# Patient Record
Sex: MALE | Race: WHITE | NOT HISPANIC OR LATINO | Employment: FULL TIME | URBAN - METROPOLITAN AREA
[De-identification: names, ages, dates, MRNs, and addresses within clinical notes are randomized per-mention and may not be internally consistent; named-entity substitution may affect disease eponyms.]

---

## 2019-05-15 ENCOUNTER — TELEPHONE (OUTPATIENT)
Dept: PALLIATIVE MEDICINE | Facility: CLINIC | Age: 59
End: 2019-05-15

## 2019-05-15 NOTE — TELEPHONE ENCOUNTER
[facility] Referral      1. Confirmed address with patient? Yes  2. Confirmed phone number with patient? Yes  3. Confirmed referring provider? Yes  4. Is the PCP the same as the referring provider? Yes  5. Has the patient been to any previous pain clinics? Yes  (If yes, send HUNTER with welcome letter) Meredith  6. Which insurance are we to bill for this appointment?  [facility]    7. Informed pt of cancellation (48 hour) policy? Yes    REGARDING OPIOID MEDICATIONS: We will always address appropriateness of opioid pain medications, but we generally will not automatically take on a prescribing role. When we do take on prescribing of opioids for chronic pain, it is in collaboration with the referring physician for an intermediate period of time (months), with an expectation that the primary physician or provider will assume the prescribing role if medications are effective at stable doses with demonstrated compliance. Therefore, please do not assume that your prescribing responsibilities end on the day of pain clinic consultation.  8. Informed pt of prescribing policy? Yes    9.Please be aware that once you are established with a pain provider and location, you will need to continue have all future visits with that provider and location. It is best to determine what location is the most convenient for you and schedule with that one.    ** PATIENT INFORMED OF THIS POLICY Yes      9. Referring Provider: Lei Díaz     10. Criteria for Triage Eval:   -Missed/Failed 1st DUAL appointment? N/A   -Medication Focused? N/A   -Mental Health Concerns? (e.g. Recent psych hospitalization/snap shot)? N/A   -Active substance abuse? N/A   -Patient behaviors (e.g. Offensive language/raised voice)? N/A

## 2019-05-23 NOTE — PROGRESS NOTES
[facility] Consultation    Date of visit: 5/23/2019    Reason for consultation:    Yasir Hines is a 59 year old male who is seen in consultation today at the request of his primary care physician, Lei Díaz.     Consultation and Evaluation for: Chronic low back and buttock pain    Review of [place] Prescription Monitoring Program (): Today I have also reviewed the patient's history of controlled substance use, as provided by [place] licensed pharmacies and prescriber dispensers. YES, no controlled substances.     Review of Pain Questionnaire: Please see the [facility] health questionnaire, which the patient completed and reviewed with me in detail.    Review of Electronic Chart: Today I have also reviewed available medical information in the patient's medical record at [facility] including relevant provider notes, laboratory work, and imaging.     Yasir Hines has not been seen at a pain clinic in the past.      Chief Complaint:    Chief Complaint   Patient presents with     Pain       Pain history:  Yasir Hines is a 59 year old male who presents for initial evaluation of chief pain complaint of Right sided low back pain radiating into the right thigh.     - Right sided buttock pain that began years ago.  This is worse in the morning and better as the day progresses.  He has had Right SI joint injections through [facility] in the past that were only helpful for about a month.  He has not had one of these for over a year now.   - New (3-6 months) onset of right thigh numbness and shooting pain.  This is most concerning to him.  He denies any weakness and continues to run about 25 miles/week.  He has run many marathons and would like to run  again this year.  He missed it last year secondary to pain.   - He has participated in formal PT about a year ago and continues to do the HEP designed for him.   - He is not on any  medications for pain.  He has used Gabepentin in the past and did not find it effective for his pain.   - Last MRI was done at [facility] on 10/20/2016. He brought in the report with him today.  Pain has changed since that time.    - He works in [place] and has a daughter who is currently a pain fellow working here at [place].    Red Flags: The patient denies bowel or bladder incontinence, parasthesias, weakness, saddle anesthesia, unintentional weight loss, or fever/chills/sweats.         Pain Treatments:  1. Medications:       Current pain medications: NONE         Previous pain medications: Gabapentin    2. Physical Therapy: has done formal PT in the past and continues to do the exercises they gave him which are helpful for his SI joint pain     TENS unit: no  3. Pain psychology: no  4. Surgery: never  5. Injections: Right SI joint injections  6. Alternative Therapies:    Chiropractic: no    Acupuncture: no      Diagnostic tests:  MRI of LUMBAR SPINE was completed on 10/20/2016 AT [facility] was reviewed with the patient and shows:  (see scanned report)     1. Developmental and degenerative disc changes.  Disc space narrowing most prominent at L3-4 and L5-S1.  Pronounced right anterolateral disc herniation at L4-5.  Facet arthropathy most prominent at L4-5 and there is degenerative right facet edema at this level.  Some extension to surrounding soft tissues.   2. At L4-5 slight impingement L5 nerve roots in the lateral recesses  3. At L5-S1, borderline moderate left foraminal stenosis        Past Medical History:  No past medical history on file.    Past Surgical History:  No past surgical history on file.    Medications:  Current Outpatient Medications   Medication Sig Dispense Refill     atorvastatin (LIPITOR) 40 MG tablet Take 1 tablet by mouth daily       gabapentin (NEURONTIN) 300 MG capsule TAKE ONE CAPSULE BY MOUTH IN THE MORNING AND 2 CAPS AT BEDTIME 270 capsule 0        Allergies:   No Known Allergies    Family history:  No family history on file.    Review of Systems:    POSTIVE IN BOLD  GENERAL: fever/chills, fatigue, general unwell feeling, weight gain/loss.  HEAD/EYES:  headache, dizziness, or vision changes.    EARS/NOSE/THROAT:  Nosebleeds, hearing loss, sinus infection, earache, tinnitus.  IMMUNE:  Allergies, cancer, immune deficiency, or infections.  SKIN:  Urticaria, rash, hives  HEME/Lymphatic:   anemia, easy bruising, easy bleeding.  RESPIRATORY:  cough, wheezing, or shortness of breath  CARDIOVASCULAR/Circulation:  Extremity edema, syncope, hypertension, tachycardia, or angina.  GASTROINTESTINAL:  abdominal pain, nausea/emesis, diarrhea, constipation,  hematochezia, or melena.  ENDOCRINE:  Diabetes, steroid use,  thyroid disease or osteoporosis.  MUSCULOSKELETAL: neck pain, back pain, arthralgia, arthritis, or gout.  GENITOURINARY:  frequency, urgency, dysuria, difficulty voiding, hematuria or incontinence.  NEUROLOGIC:  weakness, numbness, paresthesias, seizure, tremor, stroke or memory loss.  PSYCHIATRIC:  depression, anxiety, stress, suicidal thoughts or mood swings.     Physical Exam:  Vitals:    05/24/19 0951   BP: 152/87   Pulse: 64   SpO2: 97%   Weight: 96.2 kg (212 lb)     Exam:  Constitutional: Well developed, well nourished, appears stated age.  HEENT: Head atraumatic, normocephalic. Neck supple. No obvious neck masses.  Skin: No rash, lesions, or petechiae of exposed skin.   Extremities: No clubbing, cyanosis, or edema.  Psychiatric/mental status: Alert, without lethargy or stupor. Speech fluent. Appropriate affect. Mood normal. Able to follow commands without difficulty.     Musculoskeletal exam:  Gait/Station/Posture:   Normal stance, arm swing, and stride; no antalgia or Trendelenburg  Normal bulk and tone. Unremarkable spinal curvature. ASIS heights even.     Cervical spine:  Range of motion within normal limits      Lumbar spine:  Range of motion  within normal limits    Rotation/ext to right: pain free   Rotation/ext to left: pain free  Myofascial tenderness:  none  Focal tenderness: POS right SI joint tenderness.  NO gluteal, piriformis, GT, or IT tenderness  SLR: positive on the right.  Reproduced pain going into the L4 dermatome      Neurologic exam:  CN:  Cranial nerves 2-12 are grossly intact  Motor Strength:  5/5 symmetric LE strength  Hip flexion (Iliosoas L1,2,3)  Hip extension (gluteus maxiumus L5, S1,2)   Hip abduction (gluteus medius L4,5, S1)  Knee flexion (hamstrings L5, S1-2)  Knee extension (quadricepts L2,3,4)  Ankle dorsiflexion (tibialis anterior L4,5)  Ankle plantarflexion (gastrocnemius, soleus S1-2)  Ankle eversion (peronei L5, S1)  Big toe extension (ext. Mckee lungus L5,S1)    Sensory:  (upper and lower extremities):   Light touch: normal    Allodynia: absent    Hyperalgesia: absent        Assessment:      Chronic right sided low back and buttock pain that does not radiate.  Etiology likely SI joint vs piriformis syndrome.  He has had benefit with previous SI joint injection.  He also has facet arthropathy at L4-5 with facet edema on the right and this could be contributing.     New onset (3-6 months) of right sided anterior thigh pain and numbness in the L3 and L4 distribution.  Etiology likely secondary to a disc protrusion at L4-5 with impingement of the L4 nerve root.  However, meralgia parasthetica is in my differential diagnosis.         ICD-10-CM    1. Lumbar radiculopathy M54.16 PAIN INJECTION EVAL/TREAT/FOLLOW UP   2. Sacroiliitis (H) M46.1 PAIN INJECTION EVAL/TREAT/FOLLOW UP       Plan:  The following recommendations were given to the patient. Diagnosis, treatment options, risks, benefits, and alternatives were discussed, and all questions were answered. The patient expressed understanding of the plan for management.       1. Physical Therapy: Continue HEP  2. Diagnostic Studies: Reviewed lumbar MRI from Oct of 2016.  Will  consider repeat MRI if he does not get pain relief from injections. His pain has changed since his most recent MRI.   3. Medication Management:   1. NONE    MEDICATIONS:   No orders of the defined types were placed in this encounter.    4. Further procedures recommended:   1. Right SI joint injection   2. Right L4-5 TRANSFORAMINAL EPIDURAL STEROID INJECTION   3. Could consider right lateral femoral cutaneous injection in the future if he does not get pain relief   5. Referrals: Could consider neurosurgery consult for consideration of microdiscectomy in the future.   6. Release of information: NONE  7. Follow up: for injections      I spent 30 minutes of time face to face with the patient.  Greater than 50% of this time was spent in patient counseling and/or coordination of care regarding principles of multidisciplinary care, medication management, and treatment options as discussed above.         Jill PerezMD Pain Management

## 2019-05-24 ENCOUNTER — OFFICE VISIT (OUTPATIENT)
Dept: PALLIATIVE MEDICINE | Facility: CLINIC | Age: 59
End: 2019-05-24
Payer: COMMERCIAL

## 2019-05-24 VITALS
WEIGHT: 212 LBS | DIASTOLIC BLOOD PRESSURE: 87 MMHG | HEART RATE: 64 BPM | SYSTOLIC BLOOD PRESSURE: 152 MMHG | OXYGEN SATURATION: 97 %

## 2019-05-24 DIAGNOSIS — M46.1 SACROILIITIS (H): ICD-10-CM

## 2019-05-24 DIAGNOSIS — M54.16 LUMBAR RADICULOPATHY: Primary | ICD-10-CM

## 2019-05-24 PROCEDURE — 99203 OFFICE O/P NEW LOW 30 MIN: CPT | Performed by: ANESTHESIOLOGY

## 2019-05-24 RX ORDER — LOSARTAN POTASSIUM 50 MG/1
50 TABLET ORAL DAILY
COMMUNITY
Start: 2019-05-09 | End: 2021-08-30

## 2019-05-24 NOTE — PATIENT INSTRUCTIONS
Yasir to follow up with Primary Care provider regarding elevated blood pressure.      They will call you to schedule the injections.  We will be doing a Right L4 epidural injection and a Right SI joint injection.      We will see how you do after this and make a plan accordingly.  We discussed a possible neurosurgery referral to discuss microdiscectomy and a repeat MRI.     We also talked about meralgia parasthetica being in the differential diagnosis.     Jill Perez MD

## 2019-05-28 ENCOUNTER — TELEPHONE (OUTPATIENT)
Dept: PALLIATIVE MEDICINE | Facility: CLINIC | Age: 59
End: 2019-05-28

## 2019-05-28 NOTE — PROGRESS NOTES
[facility] - Procedure Note    Date of Service: 5/29/2019    Procedure performed: Right L4-5 transforaminal epidural steroid injection & Right SI joint injection with fluoroscopic guidance  Diagnosis: Lumbar spondylosis; Lumbar radiculitis/radiculopathy & Sacroilitis  : Jill Perez MD   Anesthesia: none    Indications: Yaisr Hines is a 59 year old male who is seen at the request of myself for lumbar transforaminal epidural & Right SI joint steroid injection. The patient describes right sided anterior thigh numbness and pain and right sided buttock pain made worse with running. The patient has been exhibiting symptoms consistent with lumbar intraspinal inflammation and radiculopathy. Symptoms have been persistent, disabling, and intermittently severe. The patient reports minimal improvement with conservative treatment, including PT and medications.    Lumbar MRI was done at [facility] on 10/20/2016 which showed     1. Developmental and degenerative disc changes.  Disc space narrowing most prominent at L3-4 and L5-S1.  Pronounced right anterolateral disc herniation at L4-5.  Facet arthropathy most prominent at L4-5 and there is degenerative right facet edema at this level.  Some extension to surrounding soft tissues.   2. At L4-5 slight impingement L5 nerve roots in the lateral recesses  3. At L5-S1, borderline moderate left foraminal stenosis      Allergies:    No Known Allergies     Vitals:  BP (!) 153/94   Pulse 58   SpO2 98%     Review of Systems: The patient denies recent fever, chills, illness, use of antibiotics or anticoagulants. All other 10-point review of systems negative.     Procedure: The procedure and risks were explained, and informed written consent was obtained from the patient. Risks include but are not limited to: infection, bleeding, increased pain, and damage to soft tissue, nerve, muscle, and vasculature structures. After getting informed consent, patient was  brought into the procedure suite and was placed in a prone position on the procedure table. A Pause for the Cause was performed. Patient was prepped and draped in sterile fashion.     After identifying the right L4-5 neuroforamen, the C-arm was rotated to a right lateral oblique angle.  A total of 4.5 mL of Lidocaine 1% was used to anesthetize the skin and the needle track at a skin entry site coaxial with the fluoroscopy beam, and overriding the superior aspect of the neuroforamen.  A 22 gauge 5 inch spinal needle was advanced under intermittent fluoroscopy until it entered the foramen superiorly.    The position was then inspected from anteroposterior and lateral views, and the needle adjusted appropriately.  After negative aspiration, a total of 0.5 mL of Omnipaque-300 was injected using static and continuous fluoroscopy confirming appropriate position, with spread along the nerve root sheath and into the epidural space, with no intravascular or intrathecal uptake.    2mL of 1% lidocaine with 20 mg of dexamethasone was injected.  The needle was removed. Hemostasis was achieved, the area was cleaned, and bandaids were placed when appropriate. Images were saved to PACS.    Procedure: After identifying the right SI joint, the C-arm was rotated to a obliquely to obtain the best view of the inferior angle of the joint.  A total of 2 ml of Lidocaine 1%  was used to anesthetize the skin at a skin entry site coaxial with the fluoroscopy beam at this location.  A 22 gauge 3.5 inch needle was advanced under intermittent fluoroscopy until it was felt to enter the SI joint.    A total of 0.5 ml of Omnipaque-300 was injected, confirming appropriate position, with spread into the intraarticular space, with no intravascular uptake noted.  9 ml of Omnipaque-300 was wasted. Location was verified in lateral view.    1 ml of 1% lidocaine, 1 ml of 0.5% bupivacaine with 40 mg of kenalog was injected.  The needle was removed.  Hemostasis was achieved, the area was cleaned, and bandaids were placed when appropriate.    The patient tolerated the procedure well, and was taken to the recovery room, and there was no evidence of procedural complications. No new sensory or motor deficits were noted following the procedure. The patient was stable and able to ambulate on discharge home. Post-procedure instructions were provided.     Pre-procedure pain score: 3/10 in the back, 3/10 in the leg  Post-procedure pain score: 0/10 in the back, 0/10 in the leg    Assessment/Plan: Yasir Hines is a 59 year old male s/p right L4-5 transforaminal epidural steroid injection and Right SI joint injection today for lumbar spondylosis, radiculitis/radiculopathy & Sacroilitis.     1. Following today's procedure, the patient was advised to contact the [facility] for any of the following:   Fever, chills, or night sweats   New onset of pain, numbness, or weakness   Any questions/concerns regarding the procedure  If unable to contact the Pain Center, the patient was instructed to go to a local Emergency Room for any complications.   2. The patient will receive a follow-up call in 1 week.  3. The patient should follow-up with the referring provider in 2 weeks for post-procedure evaluation.      Jill Perez MD   [facility]

## 2019-05-28 NOTE — TELEPHONE ENCOUNTER
Pre-screening Questions for Radiology Injections:    Injection to be done at which interventional clinic site? [facility]    Instruct patient to arrive as directed prior to the scheduled appointment time:    [place] minutes before      [place]: 30 minutes before; if IV needed 1 hour before     Procedure ordered by Jill Perez    Procedure ordered? Right L4-5 TRANSFORAMINAL EPIDURAL STEROID INJECTION & Right SI joint    Transforaminal Cervical DANII - Dr. Winnie Veloz ONLY    What insurance would patient like us to bill for this procedure? [facility]      Worker's comp or MVA (motor vehicle accident) -Any injection DO NOT SCHEDULE and route to Alka Morgan.      [employer] HUD insurance - For SI joint injections, DO NOT SCHEDULE and route Alka Morgan.       [employer] - Any injection besides hip/shoulder/knee joint DO NOT SCHEDULE and route to Alka Morgan. She will obtain PA and call pt back to schedule procedure or notify pt of denial.       [employer]-Route to Al[place] for review      IF SCHEDULING IN [place] AND NEEDS A PA, IT IS OKAY TO SCHEDULE. [place] HANDLES THEIR OWN PA'S AFTER THE PATIENT IS SCHEDULED. PLEASE SCHEDULE AT LEAST 1 WEEK OUT SO A PA CAN BE OBTAINED.      Any chance of pregnancy? NO   If YES, do NOT schedule and route to RN pool    Is an  needed? No     Patient has a drive home? (mandatory) YES: ok    Is patient taking any blood thinners (plavix, coumadin, jantoven, warfarin, heparin, pradaxa or dabigatran )? No   If hold needed, do NOT schedule, route to RN pool     Is patient taking any aspirin products (includes Excedrin and Fiorinal)? No     If more than 325mg/day do NOT schedule; route to RN pool     For CERVICAL procedures, hold all aspirin products for 6 days.     Tell pt that if aspirin product is not held for 6 days, the procedure WILL BE cancelled.      Does the patient have a bleeding or clotting disorder? No     If YES, okay to schedule AND route to RN nurse pool    For  any patients with platelet count <100, must be forwarded to provider    Is patient diabetic?  No  If YES, have them bring their glucometer.    Does patient have an active infection or treated for one within the past week? No     Is patient currently taking any antibiotics?  No     For patients on chronic, preventative, or prophylactic antibiotics, procedures may be scheduled.     For patients on antibiotics for active or recent infection:antibiotic course must have been completed for 4 days    Is patient currently taking any steroid medications? (i.e. Prednisone, Medrol)  No     For patients on steroid medications, course must have been completed for 4 days    Reviewed with patient:  If you are started on any steroids or antibiotics between now and your appointment, you must contact us because the procedure may need to be cancelled.  Yes    Is patient actively being treated for cancer or immunocompromised? No  If YES, do NOT schedule and route to RN pool     Are you able to get on and off an exam table with minimal or no assistance? Yes  If NO, do NOT schedule and route to RN pool    Are you able to roll over and lay on your stomach with minimal or no assistance? Yes  If NO, do NOT schedule and route to RN pool     Any allergies to contrast dye, iodine, shellfish, or numbing and steroid medications? No  If YES, route to RN pool AND add allergy information to appointment notes    Allergies: Patient has no known allergies.      Has the patient had a flu shot or any other vaccinations within 7 days before or after the procedure.  No     Does patient have an MRI/CT?  YES: MRI  (SI joint, hip injections, lumbar sympathetic blocks, and stellate ganglion blocks do not require an MRI)    Was the MRI done w/in the last 3 years?  Yes    Was MRI done at [place]? Yes      If not, where was it done? N/A       If MRI was not done at [place], [facility] or [facility] do NOT schedule and route to nursing.  If pt has an imaging  disc, the injection may be scheduled but pt has to bring disc to appt. If they show up w/out disc the injection cannot be done    Reminders (please tell patient if applicable):       Instructed pt to arrive 30 minutes early for IV start if this is for a cervical procedure, ALL sympathetic (stellate ganglion, hypogastric, or lumbar sympathetic block) and all sedation procedures (RFA, spinal cord stimulation trials).  Not Applicable   -IVs are not routinely placed for Dr. Perez cervical cases   -Dr. Ramos: IVs for cervical ESIs and cervical TBDs (not CMBBs/facet inj)      If NPO for sedation, informed patient that it is okay to take medications with sips of water (except if they are to hold blood thinners).  Not Applicable   *DO take blood pressure medication if it is prescribed*      If this is for a cervical DANII, informed patient that aspirin needs to be held for 6 days.   Not Applicable      For all patients not having spinal cord stimulator (SCS) trials or radiofrequency ablations (RFAs), informed patient:    IV sedation is not provided for this procedure.  If you feel that an oral anti-anxiety medication is needed, you can discuss this further with your referring provider or primary care provider.  The Pain Clinic provider will discuss specifics of what the procedure includes at your appointment.  Most procedures last 10-20 minutes.  We use numbing medications to help with any discomfort during the procedure.  Not Applicable      Do not schedule procedures requiring IV placement in the first appointment of the day or first appointment after lunch. Do NOT schedule at 0745, 0815 or 1245.       For patients 85 or older we recommend having an adult stay w/ them for the remainder of the day.       Does the patient have any questions?  NO  Grace Spivey  [facility]

## 2019-05-29 ENCOUNTER — RADIOLOGY INJECTION OFFICE VISIT (OUTPATIENT)
Dept: PALLIATIVE MEDICINE | Facility: CLINIC | Age: 59
End: 2019-05-29
Attending: ANESTHESIOLOGY
Payer: COMMERCIAL

## 2019-05-29 ENCOUNTER — ANCILLARY PROCEDURE (OUTPATIENT)
Dept: GENERAL RADIOLOGY | Facility: CLINIC | Age: 59
End: 2019-05-29
Attending: ANESTHESIOLOGY
Payer: COMMERCIAL

## 2019-05-29 VITALS — DIASTOLIC BLOOD PRESSURE: 94 MMHG | SYSTOLIC BLOOD PRESSURE: 153 MMHG | OXYGEN SATURATION: 98 % | HEART RATE: 58 BPM

## 2019-05-29 DIAGNOSIS — M46.1 SACROILIITIS (H): ICD-10-CM

## 2019-05-29 DIAGNOSIS — M54.16 LUMBAR RADICULOPATHY: ICD-10-CM

## 2019-05-29 DIAGNOSIS — M54.16 LUMBAR RADICULOPATHY: Primary | ICD-10-CM

## 2019-05-29 PROCEDURE — 64483 NJX AA&/STRD TFRM EPI L/S 1: CPT | Mod: RT | Performed by: ANESTHESIOLOGY

## 2019-05-29 PROCEDURE — 27096 INJECT SACROILIAC JOINT: CPT | Mod: RT | Performed by: ANESTHESIOLOGY

## 2019-05-29 NOTE — PATIENT INSTRUCTIONS
[facility] Procedure Discharge Instructions    Today you saw:   Dr. Jill Perez     Your procedure: Lumbar Epidural steroid injection and RIGHT Sacro-illiac Joint    Medications used: Epidural: Lidocaine (anesthetic)  Dexamethasone (steroid) SI joint: lidocaine (anesthetic) Kenalog (steroid)  Omnipaque (contrast)        If you were holding your blood thinning medication, please restart taking it: N/A          Be cautious when walking as numbness and/or weakness in the legs may occur up to 6-8 hours after the procedure due to effect of the local anesthetic    Do not drive for 6 hours. The effect of the local anesthetic could slow your reflexes.     Avoid strenuous activity for the first 24 hours. You may resume your regular activities after that.     You may shower, however avoid swimming, tub baths or hot tubs for 24 hours following your procedure    You may have a mild to moderate increase in pain for several days following the injection.      You may use ice packs for 10-15 minutes, 3 to 4 times a day at the injection site for comfort    Do not use heat to painful areas for 6 to 8 hours. This will give the local anesthetic time to wear off and prevent you from accidentally burning your skin.    Unless you have been directed to avoid the use of anti-inflammatory medications (NSAIDS-ibuprofen, Aleve, Motrin), you may use these medications or Tylenol for pain control if needed.     With diabetes, check your blood sugar more frequently than usual as your blood sugar may be higher than normal for 10-14 days following a steroid injection. Contact your doctor who manages your diabetes if your blood sugar is higher than usual    Possible side effects of steroids that you may experience include flushing, elevated blood pressure, increased appetite, mild headaches and restlessness.  All of these symptoms will get better with time.    It may take up to 14 days for the steroid medication to start working although  you may feel the effect as early as a few days after the procedure.     Follow up with your referring provider in 2-3 weeks      If you experience any of the following, call the pain center line during work hours at [phone] or on-call physician after hours at [phone]:  -Fever over 100 degree F  -Swelling, bleeding, redness, drainage, warmth at the injection site  -Progressive weakness or numbness in your legs   -Loss of bowel or bladder function  -Unusual headache that is not relieved by Tylenol or your regular headache medication  -Unusual new onset of pain that is not improving

## 2019-05-29 NOTE — NURSING NOTE
Discharge Information    IV Discontiued Time:  NA    Amount of Fluid Infused:  NA    Discharge Criteria = When patient returns to baseline or as per MD order    Consciousness:  Pt is fully awake    Circulation:  BP +/- 20% of pre-procedure level    Respiration:  Patient is able to breathe deeply    O2 Sat:  Patient is able to maintain O2 Sat >92% on room air    Activity:  Moves 4 extremities on command    Ambulation:  Patient is able to stand and walk or stand and pivot into wheelchair    Dressing:  Clean/dry or No Dressing    Notes:   Discharge instructions and AVS given to patient    Patient meets criteria for discharge?  YES    Admitted to PCU?  No    Responsible adult present to accompany patient home?  Yes    Signature/Title:    Jackie Flores RN Care Coordinator  [facility] [place]

## 2019-12-10 ENCOUNTER — TELEPHONE (OUTPATIENT)
Dept: PALLIATIVE MEDICINE | Facility: CLINIC | Age: 59
End: 2019-12-10

## 2019-12-10 ENCOUNTER — HOSPITAL ENCOUNTER (OUTPATIENT)
Dept: MRI IMAGING | Facility: CLINIC | Age: 59
Discharge: HOME | End: 2019-12-10
Attending: ANESTHESIOLOGY | Admitting: ANESTHESIOLOGY
Payer: COMMERCIAL

## 2019-12-10 DIAGNOSIS — M54.16 LUMBAR RADICULOPATHY: Primary | ICD-10-CM

## 2019-12-10 DIAGNOSIS — M54.16 LUMBAR RADICULOPATHY: ICD-10-CM

## 2019-12-10 PROCEDURE — 72148 MRI LUMBAR SPINE W/O DYE: CPT

## 2019-12-10 NOTE — TELEPHONE ENCOUNTER
Called kosta. He is requesting another MRI. He states that his pain is unchanged in terms of area/senation(numbness/shooting to RLE) since last MRI, but it has worsened in severity. Injection completed 05/29/19 (R L4-5 TF DANII) was helpful for about a month. He would like to have another MRI to determined next steps.     Routing to provider to review request, nursing to contact patient accordingly. Per pt, ok to leave detailed message    Aruna QUEVEDO, RN Care Coordinator  [facility]  Pain Management

## 2019-12-10 NOTE — TELEPHONE ENCOUNTER
Patient is calling to request an order for a MRI.      Alka PETTIT    [facility]

## 2019-12-10 NOTE — TELEPHONE ENCOUNTER
Patient made aware, has appt for MRI 1pm today    Aruna QUEVEDO, RN Care Coordinator  [facility]  Pain Management

## 2019-12-11 ENCOUNTER — TELEPHONE (OUTPATIENT)
Dept: PALLIATIVE MEDICINE | Facility: CLINIC | Age: 59
End: 2019-12-11

## 2019-12-11 DIAGNOSIS — M47.819 FACET ARTHROPATHY, MULTILEVEL: Primary | ICD-10-CM

## 2019-12-11 NOTE — TELEPHONE ENCOUNTER
Pre-screening questions for MBB Injections:    Injection to be done at which interventional clinic site? [facility]     Instruct patient to arrive as directed prior to the scheduled appointment time:    [place] and [place]: 30 minutes before      Procedure ordered by Dr. Perez    Procedure ordered? Lumbar Medial Branch Block    What insurance would patient like us to bill for this procedure? [facility]      Worker's comp- Any injection DO NOT SCHEDULE and route to Alka Morgan.      [employer] insurance - If scheduling an SI joint injection DO NOT SCHEDULE and route to Alka Morgan.       MBBs must be scheduled with elapsed time interval of at least 2 weeks and not more than 6 months between the First MBB and the Second MBB for insurance purposes       [employer] - Any injection besides hip/shoulder/knee joint DO NOT SCHEDULE and route to Alka Morgan. She will obtain PA and call pt back to schedule procedure or notify pt of denial.       [employer]- PA required for all MBB's      **BCBS- ALL need to be routed to Alka for review if a PA is needed**    Any chance of pregnancy? NO   If YES, do NOT schedule and route to RN pool    Is an  needed? No     Patient has a drive home? (mandatory) Yes     Is patient taking any blood thinners (plavix, coumadin, jantoven, warfarin, heparin, pradaxa or dabigatran )? No    If hold needed, do NOT schedule, route to RN pool     Is patient taking any aspirin products? Yes - Pt takes 81mg daily; instructed to hold 0 day(s) prior to procedure.      If more than 325mg/day do NOT schedule; route to RN pool     For CERVICAL procedures, hold all aspirin products for 6 days.      Does the patient have a bleeding or clotting disorder? No    If YES, okay to schedule AND route to RN nurse pool    **For any patients with platelet count <100, must be forwarded to provider**    Is patient diabetic? no If YES, have them bring their glucometer.    Does patient have an active  infection or treated for one within the past week? No    Is patient currently taking any antibiotics?  No    For patients on chronic, preventative, or prophylacti antibiotics, procedures can be scheduled.     For patients on antibiotics for active or recent infection:    Khloe Regan [place], [place], Snitzer-antibiotic course must have been completed for 4 days     Is patient currently taking any steroid medications? (i.e. Prednisone, Medrol)  No     For patients on steroid medications:    Sneha Regan [place], Snitzer-steroid course must have been completed for 4 days    Review with patient:  If you are started on any steroids or antibiotics between now and your appointment, you must contact us because it may affect our ability to perform your procedure YES    Is patient actively being treated for cancer or immunocompromised? No   If YES, do NOT schedule and route to RN    Are you able to get on and off an exam table with minimal or no assistance? Yes   If NO, do NOT schedule and route to RN    Are you able to roll over and lay on your stomach with minimal or no assistance? Yes   If NO, do NOT schedule and route to RN    Any allergies to contrast dye, iodine, shellfish, or numbing and steroid medications? No  (If so, inform nursing and note in scheduling comments.)    Allergies: Patient has no known allergies.     Has the patient had a flu shot or any other vaccinations within 7 days before or after the procedure.  No     Does patient have an MRI/CT?  Not Applicable  Check Procedure Scheduling Grid to see if required.      Was the MRI done within the last 3 years?  NA    If yes, [facility], [facility], [place] [facility], [facility] etc?       If no, do not schedule and route to nursing    If MRI was not done at [place], [facility] or [facility] do NOT schedule and route to nursing.      If pt has an imaging disc, the injection MAY be scheduled but pt has to bring disc  to appt.     If they show up without the disc the injection cannot be done      Medial Branch Block Pre-Procedure Instructions    It is okay to take long acting pain medications (if you are on them) the day of the procedure but try not to take any short acting medications unless absolutely necessary.    YES: ok   Long acting meds would include: Gabapentin (Neurontin), MS Contin, Oxycontin        Short acting meds would include:  Percocet, Oxycodone, Vicodin, Ibuprofen     The day of the procedure, you should try to do things that provoke your pain, since the injection is being done to see if it will relieve your pain . YES: ok     If your pain level is a 4 out of 10 or less on the day of the procedu re, please call [phone] to reschedule.  YES: ok     Reminders (please tell patient if applicable):      Instructed pt to arrive 30 minutes early for IV start if required. (Check Procedure Scheduling Grid) INot Applicable       If this is for a cervical MBB aspirin needs to be held for 6 days.  Not Applicable       Do not schedule procedures requiring IV placement in the first appointment of the day or first appointment after lunch. Do NOT schedule at 0745, 0815 or 1245.  ok      For patients 85 or older we recommend having an adult stay w/ them for the remainder of the day.      Does the patient have any questions? NO

## 2019-12-11 NOTE — PROGRESS NOTES
[facility] - Procedure Note    Date of Service: 12/12/2019    Procedure performed: Bilateral L3,4,5 medial branch blocks  Diagnosis: Lumbar spondylosis; Lumbar facet arthropathy  : Jill Perez MD     Indications: Yasir Hines is a 59 year old male who is seen at the request of myself for lumbar medial branch blocks. The patient describes pain with extension/rotation. The patient reports minimal improvement with conservative treatment, including previous PT, medications and previous injections.    MRI of the LUMBAR SPINE was done on 12/10/2019 which showed   FINDINGS:  Alignment is maintained. Bone marrow demonstrates bone marrow edema  surrounding the posterior aspect of the L3-4 disc, right aspect of the  L4-5 disc, and left aspect of the L5-S1 disc. No fractures are  identified. Conus medullaris and cauda equina are unremarkable.  Paraspinal soft tissues are unremarkable.     Segmental Analysis:      T12-L1:  Mild disc height loss. No herniation. Normal facet joints. No  foraminal or spinal canal stenosis.      L1-L2:  Mild disc height loss. No herniation. Normal facet joints. No  foraminal or spinal canal stenosis.       L2-L3:  Mild disc height loss. Disc bulge with superimposed left  central protrusion/annular fissure. Mild bilateral facet arthropathy.  No right foraminal stenosis. Mild left foraminal stenosis. Mild spinal  canal stenosis.       L3-L4:  Mild disc height loss. Disc bulge with posterior annular  fissuring. Mild bilateral facet arthropathy. Mild right foraminal  stenosis. No left foraminal stenosis. Mild spinal canal stenosis.       L4-L5:  Mild disc height loss. Disc bulge, asymmetric to the right.  Mild bilateral facet arthropathy. Mild bilateral foraminal stenosis.  Mild spinal canal stenosis.       L5-S1:  Severe disc height loss. Disc bulge with superimposed right  central disc protrusion which contacts and slightly displaces the  traversing right  S1 nerve root within the lateral recess (series 5  image 40). Mild bilateral facet arthropathy. No significant right  foraminal stenosis. Mild left foraminal stenosis. Mild spinal canal  stenosis.                                                                      IMPRESSION:    Degenerative disc disease as detailed including right central  protrusion at L5-S1 which contacts and displaces the traversing right  S1 nerve root within the lateral recess.    Allergies:    No Known Allergies     Vitals:  BP (!) 155/107   Pulse 69   SpO2 97%     Review of Systems: The patient denies recent fever, chills, illness, use of antibiotics or anticoagulants. All other 10-point review of systems negative.     Procedure:   Options/alternatives, benefits and risks were discussed with the patient including bleeding, infection, tissue trauma, exposure to radiation, reaction to medications, spinal cord injury, weakness, numbness and paralysis.  Questions were answered to his satisfaction and he agrees to proceed. Voluntary informed consent was obtained and signed.     After getting informed consent, patient was brought into the procedure suite and was placed in a prone position on the procedure table.   A Pause for the Cause was performed.  Patient was prepped and draped in sterile fashion.     Under AP fluoroscopic guidance the L3, L4, L5 vertebral bodies were identified. The C-arm was rotated to the oblique view to afford optimal visualization the pedicles.  Under intermittent fluoroscopy, 25 gauge 3.5 inch Quinke spinal needles were positioned inferior and lateral to the intersection of the transverse process and pedicle at the Bilateral L4 & L5 levels, as well as the corresponding sacral alar notch. The needle positions were verified and optimized from the AP and lateral views.    The anatomic targets for the L3 & L4 medial nerve and L5 dorsal ramus (which functionally incorporates the medial branch) were the  L4 & L5 transverse  processes and sacral alar notch, with laterality as described above, resulting in blockade of the L4/5 and L5/S1 facet joints.    After negative aspiration, Bupivacaine 0.5% 0.5 ml was injected at each location. The needles were removed. Hemostasis was achieved, the area was cleaned, and bandaids were placed when appropriate. The patient tolerated the procedure well, and was taken to the recovery room. Images were saved to PACS.    Assessment/Plan: Yasir Hines is a 59 year old male s/p Bilateral L3,4,5 medial branch block today for lumbar spondylosis, facet arthropathy.     Pre procecedure pain score: 6/10   Post procedure pain score: 2/10.   The patient will continue to monitor progress, and they were given a pain diary to complete at home.  They will either fax or mail this back to us or bring it to their next appointment. We will determine the treatment plan after we review the diary.      1. The patient was advised to contact the Pain Management Center for any of the following:   Fever, chills, or night sweats   New onset of pain, numbness, or weakness   Any questions/concerns regarding the procedure  If unable to contact the Pain Center, the patient was instructed to go to a local Emergency Room for any complications.   2. The patient will receive a follow-up call in 1 week.  3. We will await the pain diary to determent next step of the treatment plan and call patient to schedule.      ANGELA BONILLA MD   Pain Management & Addiction Medicine

## 2019-12-12 ENCOUNTER — RADIOLOGY INJECTION OFFICE VISIT (OUTPATIENT)
Dept: PALLIATIVE MEDICINE | Facility: CLINIC | Age: 59
End: 2019-12-12
Attending: ANESTHESIOLOGY
Payer: COMMERCIAL

## 2019-12-12 ENCOUNTER — ANCILLARY PROCEDURE (OUTPATIENT)
Dept: GENERAL RADIOLOGY | Facility: CLINIC | Age: 59
End: 2019-12-12
Attending: ANESTHESIOLOGY
Payer: COMMERCIAL

## 2019-12-12 VITALS — DIASTOLIC BLOOD PRESSURE: 107 MMHG | HEART RATE: 69 BPM | OXYGEN SATURATION: 97 % | SYSTOLIC BLOOD PRESSURE: 155 MMHG

## 2019-12-12 DIAGNOSIS — M47.816 FACET ARTHROPATHY, LUMBAR: Primary | ICD-10-CM

## 2019-12-12 DIAGNOSIS — M47.819 FACET ARTHROPATHY, MULTILEVEL: ICD-10-CM

## 2019-12-12 PROCEDURE — 64493 INJ PARAVERT F JNT L/S 1 LEV: CPT | Mod: 50 | Performed by: ANESTHESIOLOGY

## 2019-12-12 PROCEDURE — 64494 INJ PARAVERT F JNT L/S 2 LEV: CPT | Mod: 50 | Performed by: ANESTHESIOLOGY

## 2019-12-12 NOTE — PATIENT INSTRUCTIONS
[facility]   Medial Branch Block Discharge Instructions      Your procedure was performed by:  Dr. Jill Perez     Medications used: lidocaine, bupivicaine    You will need to complete the Pain Scale Log form and return it to us as soon as possible.  Once we have received the form, we will review it and call you to determine the next steps.     The form can be faxed to [phone] or mailed to:   [facility]    [id number] [place] [address], [address] [id number]      You may resume your regular activity after the injection.    Be cautious with walking since you may have numbness and/or weakness in the lower extremities for up to 6-8 hours after the procedure due to the effects of the local anesthetic.    Avoid driving for 6 hours. The local anesthetic could slow your reflexes    You may shower, however no swimming or tub baths or hot tubs for 24 hours following your procedure.    Your pain will return after the numbing medications have worn off.  You may use your current pain medications as needed.    Unless you have been directed to avoid the use of anti-inflammatory medications (NSAIDS), you may use medications such as ibuprofen, Aleve or Tylenol for pain control if needed. Some people find it helpful to alternate ibuprofen and Tylenol every 3 hours for a couple of days.    You may use ice packs 10-15 minutes three to four times a day at the injection site for comfort.     Do not use heat to painful areas for 6 to 8 hours. This will give the local anesthetic time to wear off and prevent you from accidentally burning your skin.     If you experience any of the following, call the Pain Clinic during work hours (Monday through Friday 8 am-4:30 pm) at [phone] or the Provider Line after hours at [phone]:  -Fever over 100 degree F  -Swelling, bleeding, redness, drainage, warmth at the injection site  -Progressive weakness  or numbness in your legs   -If lumbar, call if you have a loss of bowel or bladder function  -If cervical, call if you have any unusual headache that is not relieved by Tylenol  -Unusual new onset of pain that is not improving

## 2019-12-12 NOTE — NURSING NOTE
Pre-procedure Intake    Have you been fasting? No    If yes, for how long?     Are you taking a prescribed blood thinner such as coumadin, Plavix, Xarelto?    No    If yes, when did you take your last dose?     Do you take aspirin?  Yes -   ASA    If cervical procedure, have you held aspirin for 6 days?   NA    Do you have any allergies to contrast dye, iodine, steroid and/or numbing medications?  NO    Are you currently taking antibiotics or have an active infection?  NO    Have you had a fever/elevated temperature within the past week? NO    Are you currently taking oral steroids? NO    Do you have a ? Yes       Are you pregnant or breastfeeding?  NO    Are the vital signs normal?  Yes

## 2019-12-13 ENCOUNTER — TELEPHONE (OUTPATIENT)
Dept: PALLIATIVE MEDICINE | Facility: CLINIC | Age: 59
End: 2019-12-13

## 2019-12-13 DIAGNOSIS — M54.16 LUMBAR RADICULOPATHY: Primary | ICD-10-CM

## 2019-12-13 NOTE — TELEPHONE ENCOUNTER
Lumbar MBB#1 pain data reviewed. Max relief obtained:     At rest:                    100% for hour 1-5  Left facet load:        100% hour 1-5  Right facet load:      100% hour 1-5  Normal activity:       100% hour 1-5     MBB to RFA order verified:  Yes  Insurance coverage verified with CHIDI Morgan: OK to proceed to MBB or RFA: please verify MBB/RFA criteria    Aruna QUEVEDO, RN Care Coordinator  [facility]  Pain [facility]

## 2019-12-16 NOTE — TELEPHONE ENCOUNTER
[facility] Policy- No PA required        [facility]:  6 months failed conservative treatment (meds, injections, etc.);   4 weeks of PT within the last 6 months (or an unfavorable evaluation);   At least 1 successful workup resulting in significant pain relief        Okay to schedule as long as patient has done PT within the last 6 months    Routing to review      Alka PETTIT    [employer] [facility]

## 2019-12-16 NOTE — TELEPHONE ENCOUNTER
External order for PT at [facility] has been placed.      Jill Perez MD

## 2019-12-16 NOTE — TELEPHONE ENCOUNTER
Called Yasir.  Informed him his insurance says only one successful workup which he did with the MBB #1 but he has to have PT done within the last 6 months. His last PT was done in 2017. Once he has completed 4 weeks of PT we can than proceed with the RFA. He states he understands.  Advised I would let Dr Perez know what his insurance said.    Routing to provider for PT orders. He would like to go to [facility] in [place] or [place] if possible.      Cyndi Mckee RN  Care Coordinator  [facility]

## 2019-12-17 NOTE — TELEPHONE ENCOUNTER
Called Yasir. Informed him the orders for PT have been sent to [facility].  They should be calling him but gave the number of [phone] so he can reach out to them to set up his appointments.    Cyndi Mckee RN  Care Coordinator  [facility]

## 2019-12-31 NOTE — TELEPHONE ENCOUNTER
Called patient. Advised that I would fax referral to [facility], provided patient with scheduling info for [facility] location.   Faxed order [phone]     Aruna QUEVEDO, RN Care Coordinator  [facility]  Pain [facility]

## 2020-01-17 NOTE — TELEPHONE ENCOUNTER
Called Yasir.  He has had 2 Physical Therapy appointments. He has 2 more appointments to go. Once he has completed his fourth PT appointment, he will mariangel us so we can reverify his insurance for the new year and set up the RFA.    Cyndi Mckee RN  Care Coordinator  [facility]

## 2020-02-20 NOTE — TELEPHONE ENCOUNTER
Pre-screening Questions for RFA Procedure      Procedure ordered? LRFA    What insurance are we billing for this procedure?  [facility]    Has patient had this injection before? No  Any chance of pregnancy? NO   If YES, do NOT schedule and route to RN pool    Is  Needed?: No  Will patient have a ?  Yes   If pt is given sedation meds, no driving for 24 hours.  Is pt taking a cab or transportation service? YES: ok        If so will need to be accompanied by an adult too (friend/family member) in order for IV sedation to be given.      Per [place] Policy:  Outpatients are to have responsible adult or family member to accompany them at discharge and drive them home. A service providing medically trained drivers or attendants would be acceptable. Public transportation would not be acceptable unless the patient is accompanied by a responsible adult or family member.  Is patient taking any blood thinners (i.e. plavix, coumadin, jantoven, warfarin, heparin, pradaxa or dabigatran, etc)? No   If YES, do NOT schedule, and route to RN pool    Is patient taking any aspirin products? Yes - Pt takes 81mg daily; instructed to hold 0 day(s) prior to procedure.      If more than 325mg/day, OK to schedule; Instruct pt to decrease to less than 325 mg for 7 days AND route to RN pool    For CERVICAL procedures, hold all aspirin products for 6 days.     Tell pt that if aspirin product is not held for 6 days, the procedure WILL BE cancelled.      Does the patient have a bleeding or clotting disorder? No     If YES, it it OKAY to schedule AND route to RN pool    **For any patients with platelet count <100, must be forwarded to provider**    Is patient diabetic? No If YES, have them bring their glucometer.    Does patient have an active infection or treated for one within the past week? No   If YES, do NOT schedule and route to RN nurse pool     Is patient currently taking any antibiotics?  No    For patients on chronic,  preventative, or prophylactic antibiotics, procedures may be scheduled.     For patients on antibiotics for active or recent infection:antibiotic course must have been completed for 4 days    Is patient currently taking any steroid medications? (i.e. Prednisone, Medrol)  No     For patients on steroid medications, course must have been completed for 4 days    Is patient actively being treated for cancer or immunocompromised, including the spleen having been removed? No  If YES, do NOT schedule and route to RN pool     Any history of complications with sedation medications?  NO   If YES, OK to schedule AND route to RN pool     Any history of sleep apnea?  NO   If YES, OK to schedule AND route to RN pool     Any cardiac history?  NO   If YES, OK to schedule AND route to RN pool     Do you have an implanted pacemaker, ICD (implanted cardiac device) or AICD (automatic implanted cardiac device)?  NO    If YES, do NOT schedule AND route to RN pool.     Obtain name of device :       Obtain name of cardiologist:       Do you have an implanted stimulator?  NO    If YES, OK to schedule AND route to nursing.     Instruct patient to bring in the remote to the appointment and it will need to be turned off.  reviewed      Does patient have an allergy to contrast dye, iodine or shellfish?  No   If YES, OK to schedule. Route to RN pool AND add allergy information to appointment notes    Are you able to get on and off an exam table with minimal or no assistance? Yes   If NO, do NOT schedule and route to RN pool    Are you able to roll over and lay on your stomach with minimal or no assistance? Yes   If NO, do NOT schedule and route to RN pool    Reminders:    If you are started on any steroids or antibiotics between now and your appointment, you must contact us because it may affect our ability to perform your procedure.  Yes    Informed patient that s/he needs to be NPO for 6 hours before procedure?  YES    Informed  patient that it is OK to take normal medications with sips of water, especially blood pressure medications, before the procedure and must hold blood thinners as instructed.  Yes    Informed patient to arrive 30 minutes before procedure time to have an IV inserted.  reviewed   Do NOT schedule at 0745, 0815 or 1245.  reviewed     All radiofrequency ablations are in a 90 minute time slot.  reviewed

## 2020-02-24 NOTE — TELEPHONE ENCOUNTER
RFA scheduled on 02/27/2020 with Dr Perez.    Cyndi Mckee RN  Care Coordinator  [facility]

## 2020-02-26 NOTE — PROGRESS NOTES
[facility] - Procedure Note    Date of Visit: 2/27/2020    Pre procedure Diagnosis: facet arthropathy   Post procedure Diagnosis: Same  Procedure performed: Bilateral L3,4,5 radiofrequency ablation   Anesthesia: NONE  Complications: NONE  Operators: Jill Perez MD     Indications:   Yasir Hines is a 59 year old male was sent by myself for lumbar RFA.  They have a history of central low back pain.  Exam shows increased discomfort with extension and rotation and they have tried conservative treatment including physical therapy and medications.    Yasir Hines had medial branch blocks on 12/12/2019 showing appropriate pain relief, therefore radiofrequency ablation will be done.     MRI of the LUMBAR SPINE was done on 12/10/2019 which showed   FINDINGS:  Alignment is maintained. Bone marrow demonstrates bone marrow edema  surrounding the posterior aspect of the L3-4 disc, right aspect of the  L4-5 disc, and left aspect of the L5-S1 disc. No fractures are  identified. Conus medullaris and cauda equina are unremarkable.  Paraspinal soft tissues are unremarkable.     Segmental Analysis:      T12-L1:  Mild disc height loss. No herniation. Normal facet joints. No  foraminal or spinal canal stenosis.      L1-L2:  Mild disc height loss. No herniation. Normal facet joints. No  foraminal or spinal canal stenosis.       L2-L3:  Mild disc height loss. Disc bulge with superimposed left  central protrusion/annular fissure. Mild bilateral facet arthropathy.  No right foraminal stenosis. Mild left foraminal stenosis. Mild spinal  canal stenosis.       L3-L4:  Mild disc height loss. Disc bulge with posterior annular  fissuring. Mild bilateral facet arthropathy. Mild right foraminal  stenosis. No left foraminal stenosis. Mild spinal canal stenosis.       L4-L5:  Mild disc height loss. Disc bulge, asymmetric to the right.  Mild bilateral facet arthropathy. Mild bilateral foraminal stenosis.  Mild  spinal canal stenosis.       L5-S1:  Severe disc height loss. Disc bulge with superimposed right  central disc protrusion which contacts and slightly displaces the  traversing right S1 nerve root within the lateral recess (series 5  image 40). Mild bilateral facet arthropathy. No significant right  foraminal stenosis. Mild left foraminal stenosis. Mild spinal canal  stenosis.      IMPRESSION:    Degenerative disc disease as detailed including right central  protrusion at L5-S1 which contacts and displaces the traversing right  S1 nerve root within the lateral recess.    Allergies:    No Known Allergies     Vitals:  /85   Pulse 57   SpO2 97%     Review of Systems: The patient denies recent fever, chills, illness, use of antibiotics or anticoagulants. All other 10-point review of systems negative.     Physical Exam:   Resp: CTA bilaterally  CV: RRR no murmurs, rubs or gallops  Airway:  Mallampati Class I    The patient does NOT have a history of obstructive sleep apnea.       Options/alternatives, benefits and risks were discussed with the patient including bleeding, infection, no pain relief, tissue trauma, exposure to radiation, reaction to medications including seizure, spinal cord injury,increased pain after the procedure, weakness, numbness or sensory changes and headache.   We also discussed risks of sedation, including reaction to medications and cardiovascular collapse.    Questions were answered to his satisfaction and he agrees to proceed. Voluntary informed consent was obtained and signed.     Medications were reviewed:  Yes   Pre-procedure pain score: 6/10    Procedure:  After getting informed consent, patient was brought into the procedure suite and was placed in a prone position on the procedure table.   A Pause for the Cause was performed.  Patient was prepped and draped in sterile fashion.     Yasir Hines had an IV line placed prior the procedure.  The C-arm was positioned in the oblique  view to afford optimal view of the L4-L5 vertebral bodies. Lidocaine 1% was used to anesthetize the skin at each level.  At each level, a 150mm, 20G curved radiofrequency cannula with a 10mm active tip was positioned, overlying the intersection of the transverse process and pedicle at L4 & L5, and was advanced under intermittent fluoroscopy until it contacted the transverse process and notch, and the tip slightly overran that process, just lateral to the mamillary process.  The position of each cannula was verified and optimized in the oblique view and AP views.    In the AP view, another cannula was placed at the sacral alar notch.      Each position was tested for motor and sensory stimulation, and was positioned so that stimulation was negative for stimuli outside the immediate area of the desired lesion.  Sensory stimulation was completed at 50 Hz, with max stimulation up to 0.8V.  Motor stimulation was completed at 2Hz, up to 2.5V.  Lidocaine 1% 0.5 ml was injected at each level, and a 90 second, 80 degree Centigrade lesion was generated.    The needles were then rotated within the pathway of the medial branch, and locations were evaluated with repeat imaging.  Motor testing was again completed, and showed appropriate stimulation.  A second lesion was then generated at each location.    The procedure was then repeated on the other side, using the same technique as described above.    The needles were withdrawn. Hemostasis was achieved, the area was cleaned, and bandaids were placed when appropriate.  The patient tolerated the procedure well, and was taken to the recovery room.    Images were saved to PACS.      Post-procedure pain score: 1/10  Follow-up includes:   -f/u phone call in one week  -post-procedure pain medications: NONE  -f/u with the referring provider      ANGELA BONILLA MD   Pain Management & Addiction Medicine

## 2020-02-27 ENCOUNTER — RADIOLOGY INJECTION OFFICE VISIT (OUTPATIENT)
Dept: PALLIATIVE MEDICINE | Facility: CLINIC | Age: 60
End: 2020-02-27
Payer: COMMERCIAL

## 2020-02-27 ENCOUNTER — ANCILLARY PROCEDURE (OUTPATIENT)
Dept: GENERAL RADIOLOGY | Facility: CLINIC | Age: 60
End: 2020-02-27
Attending: ANESTHESIOLOGY
Payer: COMMERCIAL

## 2020-02-27 VITALS — OXYGEN SATURATION: 98 % | DIASTOLIC BLOOD PRESSURE: 85 MMHG | HEART RATE: 65 BPM | SYSTOLIC BLOOD PRESSURE: 145 MMHG

## 2020-02-27 DIAGNOSIS — G89.29 CHRONIC RIGHT-SIDED LOW BACK PAIN WITH RIGHT-SIDED SCIATICA: ICD-10-CM

## 2020-02-27 DIAGNOSIS — M54.41 CHRONIC RIGHT-SIDED LOW BACK PAIN WITH RIGHT-SIDED SCIATICA: ICD-10-CM

## 2020-02-27 DIAGNOSIS — M47.817 FACET ARTHROPATHY, LUMBOSACRAL: Primary | ICD-10-CM

## 2020-02-27 DIAGNOSIS — M47.819 FACET ARTHROPATHY, MULTILEVEL: ICD-10-CM

## 2020-02-27 PROCEDURE — 64636 DESTROY L/S FACET JNT ADDL: CPT | Mod: 50 | Performed by: ANESTHESIOLOGY

## 2020-02-27 PROCEDURE — 64635 DESTROY LUMB/SAC FACET JNT: CPT | Mod: 50 | Performed by: ANESTHESIOLOGY

## 2020-02-27 RX ORDER — OXYCODONE AND ACETAMINOPHEN 5; 325 MG/1; MG/1
1 TABLET ORAL EVERY 6 HOURS PRN
Qty: 6 TABLET | Refills: 0 | Status: SHIPPED | OUTPATIENT
Start: 2020-02-27 | End: 2021-06-23

## 2020-02-27 NOTE — PATIENT INSTRUCTIONS
[facility] Procedure Discharge Instructions       Today you saw:   Dr. Jill Perez    Your procedure:  Radiofrequency Nerve Ablation     Procedural Medications:  Lidocaine (anesthetic)           You may resume your normal diet and medications.     Avoid strenuous activity for the first 24 hours and resume regular activities after that.     Be cautious with walking as numbness and/or weakness in the lower extremities up to 6-8 hours may occur due to effect of local anesthetic     You may shower, however no swimming or tub baths or hot tubs for 24 hours following your procedure     Anticipate pain for up to 2 weeks after this procedure.    You may use ice packs 10-15 minutes three to four times a day at the injection site for comfort     You may use anti-inflammatory medications (such as Ibuprofen or Aleve or Advil) or Tylenol for pain control if necessary           It may take up to 8 weeks to receive relief from the RFA    If you experience any of the following, call the pain center line during work hours at [phone] or on call physician after hours at [phone]:  -Fever over 100 degree F   -Swelling, bleeding, redness, drainage, warmth at the injection site   -Progressive weakness or numbness in your legs   -Loss of bowel or bladder function   -Unusual headache that is not relieved by Tylenol   -Unusual new onset of pain that is not improving

## 2020-02-27 NOTE — NURSING NOTE
Pre-procedure Intake    Have you been fasting? No    If yes, for how long?  Patient ate lunch     Are you taking a prescribed blood thinner such as coumadin, Plavix, Xarelto?    No    If yes, when did you take your last dose?     Do you take aspirin?  Yes -   ASA    If cervical procedure, have you held aspirin for 6 days?   NA    Do you have any allergies to contrast dye, iodine, steroid and/or numbing medications?  NO    Are you currently taking antibiotics or have an active infection?  NO    Have you had a fever/elevated temperature within the past week? NO    Are you currently taking oral steroids? NO    Do you have a ? Yes       Are you pregnant or breastfeeding?  NO    Are the vital signs normal?  Yes

## 2020-03-02 ENCOUNTER — HEALTH MAINTENANCE LETTER (OUTPATIENT)
Age: 60
End: 2020-03-02

## 2020-12-20 ENCOUNTER — HEALTH MAINTENANCE LETTER (OUTPATIENT)
Age: 60
End: 2020-12-20

## 2021-04-24 ENCOUNTER — HEALTH MAINTENANCE LETTER (OUTPATIENT)
Age: 61
End: 2021-04-24

## 2021-06-15 DIAGNOSIS — M54.16 LUMBAR RADICULOPATHY: Primary | ICD-10-CM

## 2021-06-15 DIAGNOSIS — M54.12 CERVICAL RADICULOPATHY: ICD-10-CM

## 2021-06-21 ENCOUNTER — HOSPITAL ENCOUNTER (OUTPATIENT)
Dept: MRI IMAGING | Facility: CLINIC | Age: 61
End: 2021-06-21
Attending: ANESTHESIOLOGY
Payer: COMMERCIAL

## 2021-06-21 ENCOUNTER — ANESTHESIA (OUTPATIENT)
Dept: MRI IMAGING | Facility: CLINIC | Age: 61
End: 2021-06-21

## 2021-06-21 DIAGNOSIS — M54.12 CERVICAL RADICULOPATHY: ICD-10-CM

## 2021-06-21 DIAGNOSIS — M54.16 LUMBAR RADICULOPATHY: ICD-10-CM

## 2021-06-21 PROCEDURE — 72148 MRI LUMBAR SPINE W/O DYE: CPT

## 2021-06-21 PROCEDURE — 72141 MRI NECK SPINE W/O DYE: CPT

## 2021-06-22 NOTE — PROGRESS NOTES
"[facility]      Date of visit: 6/22/2021    Chief complaint:   Chief Complaint   Patient presents with     Pain       Interval history:  Yasir Hines is a 61 year old male last seen by me for initial consultation on 5/23/2019.  PCP is Hay Melendez MD [facility].        Since his last visit (bilateral L3,4,5 RFA 2/27/2020), Yasir Hines reports:    - He continues to have low back and mid back pain that is worse when he wakes up in the morning and gets better as the day progresses.  He continues to report that his central low back pain is his worst pain but now also has pain higher up on his back.  None of this pain radiates to the torso, hips or legs.  Stretching and exercise make it better and he describes it as aching.   - He also continues to have right thigh \"tingling\" that is less concerning to him.    - Previous injections including DANII's, right SI joint and a bilateral lumbar RFA have not been helpful for him at all.    - The main reason for his visit today is he has been having tingling in his arms and legs and perceived weakness in his legs when he looks downward.  He can reproduce this over and over and has been trying to keep his head up so this does not happen and alarm him.  This is concerning to him and limiting his activities.    - He is running 3-5 times a week 3-6 miles.  When he runs he feels better.  Endurance is a problem for him as he gets older.  Pain does not limit him.  He has gained 10#.    - Diagnosed with Covid -19 January 2021 and has made a full recovery.  He has since received both vaccinations.   - Trial of Gabapentin in the past 300mg am and 600mg at night.  Stopped because it was not helpful.   - He has participated in formal PT and continues to do the HEP designed for him.   - He works in [place] and has a daughter who is an anesthesiologist at the [employer].     Pain scores:  Pain intensity on " average is 5 on a scale of 0-10.     Current pain treatments:   NONE    INJECTIONS:   Bilateral L3,4,5 RFA 2/27/2020  S/P Right L4-5 transforaminal epidural steroid injection and RIGHT SI joint injection 5/29/2019  S/P right SI joint injections [facility]     SURGICAL HISTORY:   S/P carpal tunnel release right 2007  S/P hemorrhoidectomy 6/98  S/P right trigger finger release     IMAGING:   MRI LUMBAR SPINE 6/21/2021:   FINDINGS: There are 5 lumbar-type vertebral bodies assumed for the  purposes of this dictation. The distal spinal cord and cauda equina  appear normal.      Alignment of the lumbar spine is normal. No loss of vertebral body  height. There are no destructive osseous lesions. Moderate STIR  hyperintense endplate edema (Modic type I changes) asymmetric towards  the left at L5-S1.     Level by level as follows:      T12-L1: No loss of disc height. No significant disc herniation. Normal  facets. No spinal canal or neural foraminal narrowing.      L1-L2: No loss of disc height. No significant disc herniation. Normal  facets. No spinal canal or neural foraminal narrowing.      L2-L3: Mild loss of disc height. Posterior disc bulge more pronounced  towards the left subarticular and foraminal region. Normal facets. No  significant spinal canal narrowing. No right neural foraminal  narrowing. Mild left neural foraminal narrowing.      L3-L4: Moderate loss of disc height and signal. Circumferential disc  bulge with endplate osteophytic spurring. Mild facet hypertrophy. Mild  spinal canal narrowing. Mild right neural foraminal narrowing. Mild  left neural foraminal narrowing.      L4-L5: Moderate loss of disc height and signal. Circumferential disc  bulge with endplate osteophytic spurring. Moderate, right greater than  left, facet hypertrophy. Mild spinal canal narrowing with narrowing of  the lateral recesses which could affect the L5 nerve roots. Moderate  bilateral neural foraminal narrowing.      L5-S1: Marked  loss of disc height and signal. Circumferential disc  bulge with endplate osteophytic spurring. Superimposed right foraminal  disc protrusion. Mild facet hypertrophy. No significant spinal canal  narrowing however the right subarticular disc protrusion abuts the  traversing right S1 nerve root in the lateral recess. Mild right  neural foraminal narrowing. Moderate left neural foraminal narrowing.      Paraspinous soft tissues: Unremarkable.                                                                    IMPRESSION:    1. Multilevel degenerative changes throughout the lumbar spine as  detailed above.  2. Overall, no significant change since prior MR 12/10/2019.  3. At L2-L3, there is mild left neural foraminal narrowing.  4. At L3-L4, there is mild spinal canal narrowing as well as mild  bilateral neural foraminal narrowing.  5. At L4-L5, there is mild spinal canal narrowing and narrowing of the  lateral recesses which could affect the L5 nerve roots. There is also  moderate bilateral neural foraminal narrowing.  6. At L5-S1, there is a right subarticular disc protrusion which abuts  the traversing right S1 nerve root. There is also mild right and  moderate left neural foraminal narrowing.     MRI CERVICAL SPINE 6/21/2021:   FINDINGS: Normal cervical lordosis. Anterior posterior alignment of  the spine is within normal limits. Vertebral body height is maintained  without evidence of fracture. There are no destructive osseous  lesions. Marked STIR hyperintense endplate edema (Modic type I  changes) at C5-C6.     No abnormal signal appreciated within the visualized spinal cord.     Level by level as follows:      C2-C3: No loss of disc height. No significant disc herniation. Normal  facets. No spinal canal or neural foraminal narrowing.      C3-C4: Mild loss of disc height. Posterior disc bulge with endplate  osteophytic spurring. Normal facets. No spinal canal narrowing. Mild  bilateral neural foraminal narrowing.       C4-C5: Mild loss of disc height. Posterior disc bulge with mild  endplate osteophytic spurring. Normal facets. No spinal canal  narrowing. No significant neural foraminal narrowing.      C5-C6: Moderate loss of disc height with degenerative endplate  changes. Circumferential disc bulge with endplate osteophytic  spurring. Normal facets. Mild spinal canal narrowing. Moderate  bilateral neural foraminal narrowing.      C6-C7: Moderate loss of disc height. Circumferential disc bulge with  endplate osteophytic spurring. Normal facets. No significant spinal  canal narrowing. Mild bilateral neural foraminal narrowing.      C7-T1: No loss of disc height. No significant disc herniation. Normal  facets. No spinal canal or neural foraminal narrowing.      Paraspinous soft tissues are unremarkable.                                                                    IMPRESSION:    1. Degenerative changes in the cervical spine, most pronounced at the  C5-C6 level.  2. At C5-C6, there is mild spinal canal narrowing as well as moderate  bilateral neural foraminal narrowing.  3. Marked endplate edema at C5-C6. This finding has been associated  with a source of pain in some patients.       Medications:  Current Outpatient Medications   Medication Sig Dispense Refill     atorvastatin (LIPITOR) 40 MG tablet Take 1 tablet by mouth daily       gabapentin (NEURONTIN) 300 MG capsule TAKE ONE CAPSULE BY MOUTH IN THE MORNING AND 2 CAPS AT BEDTIME (Patient not taking: Reported on 5/24/2019) 270 capsule 0     losartan (COZAAR) 50 MG tablet Take 50 mg by mouth daily       oxyCODONE-acetaminophen (PERCOCET) 5-325 MG tablet Take 1 tablet by mouth every 6 hours as needed for severe pain 6 tablet 0         Review of Systems:  ROS:  Constitutional, neuro, ENT, endocrine, pulmonary, cardiac, gastrointestinal, genitourinary, musculoskeletal, integument and psychiatric systems are negative, except as otherwise noted.    Physical Exam:  Blood  pressure 131/82, pulse 74, SpO2 97 %.      GENERAL: Healthy, alert and no distress  EYES: Eyes grossly normal to inspection.  No discharge or erythema, or obvious scleral/conjunctival abnormalities.  RESP: No audible wheeze, cough, or visible cyanosis.  No visible retractions or increased work of breathing.    SKIN: Visible skin clear. No significant rash, abnormal pigmentation or lesions.  NEURO: Cranial nerves grossly intact.  Mentation and speech appropriate for age.  PSYCH: Mentation appears normal, affect normal/bright, judgement and insight intact, normal speech and appearance well-groomed.    TTP over the thoracic and lumbar paraspinals.    NO TTP over the bilateral SI joints  Strength is 5/5 bilateral UE and LE's.   Gait is normal       ASSESSMENT/PLAN:     1. Central low back and mid back pain - We reviewed his Lumbar MRI today which has not changed since his previous imaging.  He has some mild to moderate foraminal stenosis at the L4-5 and L5-S1 level and some mild to moderate facet arthropathy at L4-5.  However, none of the findings really correlate with his symptoms as he does not have any radiating pain.  Previous bilateral RFA was not helpful in relieving his central low back pain. Previous DANII's and SI joint injections were not helpful for his pain.  His pain is worse in the morning and better as the day progresses.  He describes it as aching.  This leads me to believe the majority of his pain is myofascial pain.  We discussed formal PT ([facility]ES vs physicians neck and back) and he would like to do a HEP for now.  We also talked about massage, massage guns and stretching.  I gave him a trial of tizanidine to see if this is helpful.     2. Acute onset of leg numbness and weakness when he looks down with his neck.  This is reproducable and happens every time he looks down.  We reviewed his cervical MRI which showed some foraminal stenosis at he C5-6 levels bilaterally but was otherwise unremarkable.  I do  not know what is causing this but I am going to refer him to neurosurgery and send him to have cervical flexion and extension films done today to look for instability.         MEDICATIONS:   Orders Placed This Encounter   Medications     ibuprofen (ADVIL/MOTRIN) 100 MG tablet     Sig: Take 100 mg by mouth every 4 hours as needed     omega 3 1000 MG CAPS     Glucosamine Sulfate 1000 MG TABS     aspirin (ASA) 81 MG chewable tablet     Sig: Take 81 mg by mouth daily Every night     tiZANidine (ZANAFLEX) 2 MG tablet     Sig: Take 1 tablet (2 mg) by mouth 3 times daily as needed for muscle spasms     Dispense:  30 tablet     Refill:  0       FOLLOW UP:  I will call him tomorrow to discuss his xray results.       BILLING TIME DOCUMENTATION:   The total TIME spent on this patient on the date of the encounter/appointment was 50 minutes.      TOTAL TIME includes:   Time spent preparing to see the patient (reviewing records and tests) - 4 min  Time spent face to face (or over the phone) with the patient - 36 min  Time spent ordering tests, medications, procedures and referrals - 2 min  Time spent Referring and communicating with other healthcare professionals - 0 min  Time spent documenting clinical information in Epic - 8 min        ANGELA BONILLA MD   Pain Management & Addiction Medicine

## 2021-06-23 ENCOUNTER — ANCILLARY PROCEDURE (OUTPATIENT)
Dept: GENERAL RADIOLOGY | Facility: CLINIC | Age: 61
End: 2021-06-23
Attending: ANESTHESIOLOGY
Payer: COMMERCIAL

## 2021-06-23 ENCOUNTER — OFFICE VISIT (OUTPATIENT)
Dept: PALLIATIVE MEDICINE | Facility: CLINIC | Age: 61
End: 2021-06-23
Payer: COMMERCIAL

## 2021-06-23 VITALS — HEART RATE: 74 BPM | DIASTOLIC BLOOD PRESSURE: 82 MMHG | OXYGEN SATURATION: 97 % | SYSTOLIC BLOOD PRESSURE: 131 MMHG

## 2021-06-23 DIAGNOSIS — M54.12 CERVICAL RADICULOPATHY: ICD-10-CM

## 2021-06-23 DIAGNOSIS — M54.12 CERVICAL RADICULOPATHY: Primary | ICD-10-CM

## 2021-06-23 DIAGNOSIS — M79.18 MYOFASCIAL PAIN: ICD-10-CM

## 2021-06-23 PROCEDURE — 72052 X-RAY EXAM NECK SPINE 6/>VWS: CPT | Performed by: RADIOLOGY

## 2021-06-23 PROCEDURE — 99215 OFFICE O/P EST HI 40 MIN: CPT | Performed by: ANESTHESIOLOGY

## 2021-06-23 RX ORDER — ASPIRIN 81 MG/1
81 TABLET, CHEWABLE ORAL DAILY
COMMUNITY
End: 2021-08-30

## 2021-06-23 RX ORDER — OMEGA-3 FATTY ACIDS/FISH OIL 300-1000MG
CAPSULE ORAL
COMMUNITY

## 2021-06-23 RX ORDER — TIZANIDINE 2 MG/1
2 TABLET ORAL 3 TIMES DAILY PRN
Qty: 30 TABLET | Refills: 0 | Status: SHIPPED | OUTPATIENT
Start: 2021-06-23

## 2021-06-23 ASSESSMENT — PAIN SCALES - GENERAL: PAINLEVEL: MODERATE PAIN (5)

## 2021-06-23 NOTE — PATIENT INSTRUCTIONS
We discussed the results of your lumbar and cervical MRI's.     I believe your low back pain is myofascial or muscle related pain.  We discussed doing a home exercise program of PT vs form PT vs [facility].      I gave you a prescription for a muscle relaxant to try - tizanidine.      You should consider getting a massage gun or doing formal massage therapy to help loosen up those muscles.      I am not recommending more injections for your low back as this has not been helpful in the past.      I will call you to discuss your neck xray you are having done today     I have referred you to neurosurgery to discuss the symptoms you are having when you look down.     Jill Perez MD

## 2021-06-25 ENCOUNTER — MYC MEDICAL ADVICE (OUTPATIENT)
Dept: PALLIATIVE MEDICINE | Facility: CLINIC | Age: 61
End: 2021-06-25

## 2021-06-25 NOTE — TELEPHONE ENCOUNTER
Routing to provider for recommendation    ----------------Mychart Below from pt----------------  Dr. Perez,     Dr. Frank Rolon doesn't see patients at [facility] anymore. He is at [facility] in [place]. Would you recommend to see someone else or schedule with Dr. Rolon at [facility]?     Thank you, Yasir

## 2021-06-25 NOTE — TELEPHONE ENCOUNTER
I put in a consult for him to see neurosurgery through [facility], however, I do think Dr. Rolon is a great neurosurgeon and if his insurance will allow him to go to [facility] I would recommend he see him.     Jill Perez MD

## 2021-06-25 NOTE — TELEPHONE ENCOUNTER
[facility] message sent to patient: Good Afternoon Yasir,    Dr Perez did review your [facility] message.  She did put in a consult for you to see Neurosurgery through [facility].  Their scheduling department will call you to get this set up.  If you do not hear from them by next Monday, please call [phone].  However, Dr Perez does think Dr Rolon is a great Neurosurgeon so if you insurance allows you to go to [facility], she would recommend you see him.    Take Care,    Cyndi Ferrell RN  Care Coordinator  [facility]

## 2021-07-22 ENCOUNTER — TRANSFERRED RECORDS (OUTPATIENT)
Dept: HEALTH INFORMATION MANAGEMENT | Facility: CLINIC | Age: 61
End: 2021-07-22

## 2021-08-04 ENCOUNTER — TRANSCRIBE ORDERS (OUTPATIENT)
Dept: OTHER | Age: 61
End: 2021-08-04

## 2021-08-04 ENCOUNTER — TRANSFERRED RECORDS (OUTPATIENT)
Dept: HEALTH INFORMATION MANAGEMENT | Facility: CLINIC | Age: 61
End: 2021-08-04

## 2021-08-04 DIAGNOSIS — M54.50 LOW BACK PAIN: Primary | ICD-10-CM

## 2021-08-06 ENCOUNTER — TELEPHONE (OUTPATIENT)
Dept: PALLIATIVE MEDICINE | Facility: CLINIC | Age: 61
End: 2021-08-06

## 2021-08-06 NOTE — TELEPHONE ENCOUNTER
Screening Questions for Radiology Injections:    Injection to be done at which interventional clinic site? [facility]    If [facility] location, tell patient that this procedure requires a COVID-19 lab test be done within 4 days of the procedure. Would you still like to move forward with scheduling the procedure?  Not Applicable   If YES, let patient know that someone will call them to schedule the COVID-19 test and that they will only receive a call back if the result is positive. Route to nursing to enter order.     Instruct patient to arrive as directed prior to the scheduled appointment time:    [place] minutes before      [place]: 30 minutes before; if IV needed 1 hour before     Procedure ordered by Gonzales - DANETTE    Procedure ordered? Right L4-5 and L5-S1      Transforaminal Cervical DANII -  [place] does NOT have providers that do these- [facility] and [facility] do have providers that do    As a reminder, receiving steroids can decrease your body's ability to fight infection.   Would you still like to move forward with scheduling the injection?  Yes    What insurance would patient like us to bill for this procedure? [employer]      Worker's comp or MVA (motor vehicle accident) -Any injection DO NOT SCHEDULE and route to Alka Morgan.      [employer] insurance - For SI joint injections, DO NOT SCHEDULE and route Alka Morgan.       ALL BCBS, [employer] and [employer]-Route to [place] for review DO NOT SCHEDULE      IF SCHEDULING IN [place] AND NEEDS A PA, IT IS OKAY TO SCHEDULE. [place] HANDLES THEIR OWN PA'S AFTER THE PATIENT IS SCHEDULED. PLEASE SCHEDULE AT LEAST 1 WEEK OUT SO A PA CAN BE OBTAINED.    Any chance of pregnancy? Not Applicable   If YES, do NOT schedule and route to RN pool    Is an  needed? No     Patient has a drive home? (mandatory) YES: INFORMED    Is patient taking any blood thinners (i.e. plavix, coumadin, jantoven, warfarin, heparin, pradaxa or  dabigatran, etc)? No   If hold needed, do NOT schedule, route to RN pool     Is patient taking any aspirin products (includes Excedrin and Fiorinal)? Yes - Pt takes 81mg daily; instructed to hold 0 day(s) prior to procedure.      If more than 325mg/day, OK to schedule; Instruct pt to decrease to less than 325 mg for 7 days AND route to RN pool    For CERVICAL procedures, hold all aspirin products for 6 days.     Tell pt that if aspirin product is not held for 6 days, the procedure WILL BE cancelled.      Does the patient have a bleeding or clotting disorder? No     If YES, okay to schedule AND route to RN nurse pool    For any patients with platelet count <100, must be forwarded to provider    Any allergies to contrast dye, iodine, shellfish, or numbing and steroid medications? No    If YES, add allergy information to appointment notes AND route to the RN pool     If DANII and Contrast Dye / Iodine Allergy? DO NOT SCHEDULE, route to RN pool    Allergies: Patient has no known allergies.     Is patient diabetic?  No  If YES, instruct them to bring their glucometer.    Does patient have an active infection or treated for one within the past week? No     Is patient currently taking any antibiotics?  No     For patients on chronic, preventative, or prophylactic antibiotics, procedures may be scheduled.     For patients on antibiotics for active or recent infection:antibiotic course must have been completed for 4 days    Is patient currently taking any steroid medications? (i.e. Prednisone, Medrol)  No     For patients on steroid medications, course must have been completed for 4 days    Is patient actively being treated for cancer or immunocompromised? No  If YES, do NOT schedule and route to RN pool     Are you able to get on and off an exam table with minimal or no assistance? Yes  If NO, do NOT schedule and route to RN pool    Are you able to roll over and lay on your stomach with minimal or no assistance? Yes  If NO,  do NOT schedule and route to RN pool     Has the patient had a flu shot or any other vaccinations within 7 days before or after the procedure.  No     Have you recently had a COVID vaccine or have plans to get it in the near future? No    If yes, explain that for the vaccine to work best they need to:       wait 1 week before and 1 week after getting Vaccine #1    wait 1 week before and 2 weeks after getting Vaccine #2    If patient has concerns about the timing, send to RN pool     Does patient have an MRI/CT?  YES: 2021  Check Procedure Scheduling Grid to see if required.      Was the MRI done within the last 3 years?  Yes    If yes, where was the MRI done i.e.[facility], [facility], [facility], [facility] etc? [facility]      If no, do not schedule and route to RN pool    If MRI was not done at [place], [facility] or [facility] do NOT schedule and route to RN pool.      If pt has an imaging disc, the injection MAY be scheduled but pt has to bring disc to appt.     If they show up without the disc the injection cannot be done    Procedure Specific Instructions:      If celiac plexus block, informed patient NPO for 6 hours and that it is okay to take medications with sips of water, especially blood pressure medications  Not Applicable         If this is for a cervical procedure, informed patient that aspirin needs to be held for 6 days.   Not Applicable      If IV needed:    Do not schedule procedures requiring IV placement in the first appointment of the day or first appointment after lunch. Do NOT schedule at 0745, 0815 or 1245.     Instructed pt to arrive 30 minutes early for IV start if required. (Check Procedure Scheduling Grid)  Not Applicable    Reminders:      If you are started on any steroids or antibiotics between now and your appointment, you must contact us because the procedure may need to be cancelled.  Yes      For all procedures except radiofrequency ablations (RFAs) and spinal cord stimulator (SCS)  trials, informed patient:    IV sedation is not provided for this procedure.  If you feel that an oral anti-anxiety medication is needed, you can discuss this further with your referring provider or primary care provider.  The Pain Clinic provider will discuss specifics of what the procedure includes at your appointment.  Most procedures last 10-20 minutes.  We use numbing medications to help with any discomfort during the procedure.  Not Applicable      For patients 85 or older we recommend having an adult stay w/ them for the remainder of the day.       Does the patient have any questions?  NO  Ilda Mcallister  [facility]

## 2021-08-10 ENCOUNTER — ANESTHESIA EVENT (OUTPATIENT)
Dept: MRI IMAGING | Facility: CLINIC | Age: 61
End: 2021-08-10

## 2021-08-12 NOTE — TELEPHONE ENCOUNTER
Action    Action Taken 8-12: requested echo 8-3-21 from [facility]  8-12: requested EKG 7-22-21 from [facility]    8-12: Resolved echo in PACS  8-25: EKG was done at [facility], so sent request there  8-26: Received EKG and sent to urgent scanning

## 2021-08-17 NOTE — PROGRESS NOTES
[facility] - Procedure Note    Date of Visit: 8/19/2021    Procedure performed: Lumbar L4-5 interlaminar epidural steroid injection  Diagnosis: Lumbar spondylosis; Lumbar radiculitis/radiculopathy  : Jill Perez MD & Jama Vela MD (pain fellow)   Anesthesia: none    Indications: Yasir Hines is a 61 year old male who is seen at the request of Dr. AYALA for a lumbar epidural steroid injection. The patient describes bilateral anterior thigh numbness and pain made worse with running. The patient has been exhibiting symptoms consistent with lumbar intraspinal inflammation and radiculopathy. Symptoms have been persistent, disabling, and intermittently severe. The patient reports minimal improvement with conservative treatment, including previous injections, PT and medications.    S/P right L4-5 transforaminal epidural steroid injection and Right SI joint injection 5/29/2019  S/P Bilateral L3,4,5 RFA 2/27/2020    Lumbar MRI was done on 6/21/2021 which showed:   FINDINGS: There are 5 lumbar-type vertebral bodies assumed for the  purposes of this dictation. The distal spinal cord and cauda equina  appear normal.      Alignment of the lumbar spine is normal. No loss of vertebral body  height. There are no destructive osseous lesions. Moderate STIR  hyperintense endplate edema (Modic type I changes) asymmetric towards  the left at L5-S1.     Level by level as follows:      T12-L1: No loss of disc height. No significant disc herniation. Normal  facets. No spinal canal or neural foraminal narrowing.      L1-L2: No loss of disc height. No significant disc herniation. Normal  facets. No spinal canal or neural foraminal narrowing.      L2-L3: Mild loss of disc height. Posterior disc bulge more pronounced  towards the left subarticular and foraminal region. Normal facets. No  significant spinal canal narrowing. No right neural foraminal  narrowing. Mild left neural foraminal  narrowing.      L3-L4: Moderate loss of disc height and signal. Circumferential disc  bulge with endplate osteophytic spurring. Mild facet hypertrophy. Mild  spinal canal narrowing. Mild right neural foraminal narrowing. Mild  left neural foraminal narrowing.      L4-L5: Moderate loss of disc height and signal. Circumferential disc  bulge with endplate osteophytic spurring. Moderate, right greater than  left, facet hypertrophy. Mild spinal canal narrowing with narrowing of  the lateral recesses which could affect the L5 nerve roots. Moderate  bilateral neural foraminal narrowing.      L5-S1: Marked loss of disc height and signal. Circumferential disc  bulge with endplate osteophytic spurring. Superimposed right foraminal  disc protrusion. Mild facet hypertrophy. No significant spinal canal  narrowing however the right subarticular disc protrusion abuts the  traversing right S1 nerve root in the lateral recess. Mild right  neural foraminal narrowing. Moderate left neural foraminal narrowing.      Paraspinous soft tissues: Unremarkable.                                                                    IMPRESSION:    1. Multilevel degenerative changes throughout the lumbar spine as  detailed above.  2. Overall, no significant change since prior MR 12/10/2019.  3. At L2-L3, there is mild left neural foraminal narrowing.  4. At L3-L4, there is mild spinal canal narrowing as well as mild  bilateral neural foraminal narrowing.  5. At L4-L5, there is mild spinal canal narrowing and narrowing of the  lateral recesses which could affect the L5 nerve roots. There is also  moderate bilateral neural foraminal narrowing.  6. At L5-S1, there is a right subarticular disc protrusion which abuts  the traversing right S1 nerve root. There is also mild right and  moderate left neural foraminal narrowing.     Allergies:    No Known Allergies     Vitals:  BP (!) 147/90   Pulse 57   SpO2 98%     Review of Systems: The patient denies  recent fever, chills, illness, use of antibiotics or anticoagulants. All other 10-point review of systems negative.     Procedure: The procedure and risks were explained, and informed written consent was obtained from the patient. Risks include but are not limited to: infection, bleeding, increased pain, and damage to soft tissue, nerve, muscle, and vasculature structures. After getting informed consent, patient was brought into the procedure suite and was placed in a prone position on the procedure table. A Pause for the Cause was performed. Patient was prepped and draped in sterile fashion.     The L4-5 interspace was identified with use of fluoroscopy in AP view. A 25-gauge, 1.5 inch needle was used to anesthetize the skin and subcutaneous tissue entry site with a total of 2 ml of 1% lidocaine. Under fluoroscopic visualization, a 22-gauge, 4.5 inch Tuohy epidural needle was slowly advanced towards the epidural space a few millimeters left of midline. The latter part of the needle advancement was guided with fluoroscopy in the lateral view. The epidural space was identified using loss of resistance technique. After negative aspiration for heme and cerebrospinal fluid, a total of 1 mL of non-ionic contrast was injected to confirm needle placement with 9 mL of contrast wasted. Epidurogram confirmed spread within the posterior epidural space. 2 ml of 40mg/ml of triamcinolone, 2 ml of 1% lidocaine, and 1 ml of preservative free saline was injected. The needle was removed.  Images were saved to PACS.    The patient tolerated the procedure well, and there was no evidence of procedural complications. No new sensory or motor deficits were noted following the procedure. The patient was stable and able to ambulate on discharge home. Post-procedure instructions were provided.     Pre-procedure pain score: 3/10 in the back, 2/10 in the leg  Post-procedure pain score: 0/10 in the back, 0/10 in the leg    Assessment/Plan: Yasir  GAEL Hines is a 61 year old male s/p lumbar interlaminar epidural steroid injection today for lumbar spondylosis and radiculitis/radiculopathy.     1. Following today's procedure, the patient was advised to contact the Pain Management Center for any of the following:   Fever, chills, or night sweats   New onset of pain, numbness, or weakness   Any questions/concerns regarding the procedure  If unable to contact the Pain Center, the patient was instructed to go to a local Emergency Room for any complications.   2. The patient will receive a follow-up call in 1 week.   3. Follow-up with the referring provider in 2 weeks for post-procedure evaluation.    ANGELA BONILLA MD   Pain Management & Addiction Medicine

## 2021-08-19 ENCOUNTER — RADIOLOGY INJECTION OFFICE VISIT (OUTPATIENT)
Dept: PALLIATIVE MEDICINE | Facility: CLINIC | Age: 61
End: 2021-08-19
Payer: COMMERCIAL

## 2021-08-19 VITALS — OXYGEN SATURATION: 98 % | HEART RATE: 58 BPM | DIASTOLIC BLOOD PRESSURE: 80 MMHG | SYSTOLIC BLOOD PRESSURE: 139 MMHG

## 2021-08-19 DIAGNOSIS — M54.50 LOW BACK PAIN: ICD-10-CM

## 2021-08-19 DIAGNOSIS — M54.16 LUMBAR RADICULOPATHY: Primary | ICD-10-CM

## 2021-08-19 PROCEDURE — 62323 NJX INTERLAMINAR LMBR/SAC: CPT | Performed by: ANESTHESIOLOGY

## 2021-08-19 RX ORDER — DEXAMETHASONE SODIUM PHOSPHATE 10 MG/ML
10 INJECTION INTRAMUSCULAR; INTRAVENOUS ONCE
Status: DISCONTINUED | OUTPATIENT
Start: 2021-08-19 | End: 2021-08-19

## 2021-08-19 RX ORDER — TRIAMCINOLONE ACETONIDE 40 MG/ML
40 INJECTION, SUSPENSION INTRA-ARTICULAR; INTRAMUSCULAR ONCE
Status: COMPLETED | OUTPATIENT
Start: 2021-08-19 | End: 2021-08-19

## 2021-08-19 RX ADMIN — TRIAMCINOLONE ACETONIDE 40 MG: 40 INJECTION, SUSPENSION INTRA-ARTICULAR; INTRAMUSCULAR at 13:27

## 2021-08-19 RX ADMIN — TRIAMCINOLONE ACETONIDE 40 MG: 40 INJECTION, SUSPENSION INTRA-ARTICULAR; INTRAMUSCULAR at 13:28

## 2021-08-19 NOTE — NURSING NOTE
Discharge Information    IV Discontiued Time:  NA    Amount of Fluid Infused:  NA    Discharge Criteria = When patient returns to baseline or as per MD order    Consciousness:  Pt is fully awake    Circulation:  BP +/- 20% of pre-procedure level    Respiration:  Patient is able to breathe deeply    O2 Sat:  Patient is able to maintain O2 Sat >92% on room air    Activity:  Moves 4 extremities on command    Ambulation:  Patient is able to stand and walk or stand and pivot into wheelchair    Dressing:  Clean/dry or No Dressing    Notes:   Discharge instructions and AVS given to patient    Patient meets criteria for discharge?  YES    Admitted to PCU?  No    Responsible adult present to accompany patient home?  Yes    Signature/Title:    Aruna Jj RN  RN Care Coordinator  [facility]

## 2021-08-19 NOTE — PATIENT INSTRUCTIONS
[facility] Procedure Discharge Instructions    Today you saw:   Dr. Jill Perez     Your procedure:  Epidural steroid injection        Medications used:  Lidocaine (anesthetic)   Kenalog (steroid)  Normal Saline, Omnipaque (contrast)            Be cautious when walking as numbness and/or weakness in the legs may occur up to 6-8 hours after the procedure due to effect of the local anesthetic    Do not drive for 6 hours. The effect of the local anesthetic could slow your reflexes.     Avoid strenuous activity for the first 24 hours. You may resume your regular activities after that.     You may shower, however avoid swimming, tub baths or hot tubs for 24 hours following your procedure    You may have a mild to moderate increase in pain for several days following the injection.      You may use ice packs for 10-15 minutes, 3 to 4 times a day at the injection site for comfort    Do not use heat to painful areas for 6 to 8 hours. This will give the local anesthetic time to wear off and prevent you from accidentally burning your skin.    Unless you have been directed to avoid the use of anti-inflammatory medications (NSAIDS-ibuprofen, Aleve, Motrin), you may use these medications or Tylenol for pain control if needed.     With diabetes, check your blood sugar more frequently than usual as your blood sugar may be higher than normal for 10-14 days following a steroid injection. Contact your doctor who manages your diabetes if your blood sugar is higher than usual    Possible side effects of steroids that you may experience include flushing, elevated blood pressure, increased appetite, mild headaches and restlessness.  All of these symptoms will get better with time.    It may take up to 14 days for the steroid medication to start working although you may feel the effect as early as a few days after the procedure.     Follow up with your referring provider in 2-3 weeks      If you experience any of the  following, call the pain center line during work hours at [phone] or on-call physician after hours at [phone]:  -Fever over 100 degree F  -Swelling, bleeding, redness, drainage, warmth at the injection site  -Progressive weakness or numbness in your legs   -Loss of bowel or bladder function  -Unusual headache that is not relieved by Tylenol or your regular headache medication  -Unusual new onset of pain that is not improving

## 2021-08-30 ENCOUNTER — ANCILLARY PROCEDURE (OUTPATIENT)
Dept: CARDIOLOGY | Facility: CLINIC | Age: 61
End: 2021-08-30
Attending: INTERNAL MEDICINE
Payer: COMMERCIAL

## 2021-08-30 ENCOUNTER — PRE VISIT (OUTPATIENT)
Dept: CARDIOLOGY | Facility: CLINIC | Age: 61
End: 2021-08-30

## 2021-08-30 VITALS — HEIGHT: 71 IN | BODY MASS INDEX: 29.4 KG/M2 | WEIGHT: 210 LBS

## 2021-08-30 DIAGNOSIS — E78.00 HYPERCHOLESTEROLEMIA: ICD-10-CM

## 2021-08-30 DIAGNOSIS — R00.2 PALPITATIONS: ICD-10-CM

## 2021-08-30 DIAGNOSIS — I35.9 CALCIFICATION OF AORTIC VALVE: ICD-10-CM

## 2021-08-30 DIAGNOSIS — R55 SYNCOPE: ICD-10-CM

## 2021-08-30 DIAGNOSIS — I10 ESSENTIAL HYPERTENSION: ICD-10-CM

## 2021-08-30 DIAGNOSIS — I49.8 OTHER CARDIAC ARRHYTHMIA: ICD-10-CM

## 2021-08-30 DIAGNOSIS — R55 SYNCOPE, UNSPECIFIED SYNCOPE TYPE: Primary | ICD-10-CM

## 2021-08-30 DIAGNOSIS — I35.0 AORTIC VALVE STENOSIS, ETIOLOGY OF CARDIAC VALVE DISEASE UNSPECIFIED: ICD-10-CM

## 2021-08-30 PROCEDURE — 93248 EXT ECG>7D<15D REV&INTERPJ: CPT | Performed by: INTERNAL MEDICINE

## 2021-08-30 PROCEDURE — 99204 OFFICE O/P NEW MOD 45 MIN: CPT | Mod: 25 | Performed by: INTERNAL MEDICINE

## 2021-08-30 PROCEDURE — 93246 EXT ECG>7D<15D RECORDING: CPT

## 2021-08-30 PROCEDURE — G0463 HOSPITAL OUTPT CLINIC VISIT: HCPCS | Mod: 25

## 2021-08-30 PROCEDURE — 93005 ELECTROCARDIOGRAM TRACING: CPT | Mod: XU

## 2021-08-30 RX ORDER — AMLODIPINE BESYLATE 5 MG/1
5 TABLET ORAL DAILY
Qty: 90 TABLET | Refills: 1 | Status: SHIPPED | OUTPATIENT
Start: 2021-08-30 | End: 2021-12-15

## 2021-08-30 RX ORDER — LOSARTAN POTASSIUM 100 MG/1
100 TABLET ORAL DAILY
Qty: 90 TABLET | Refills: 1 | Status: SHIPPED | OUTPATIENT
Start: 2021-08-30 | End: 2022-01-27

## 2021-08-30 ASSESSMENT — MIFFLIN-ST. JEOR: SCORE: 1779.68

## 2021-08-30 ASSESSMENT — PAIN SCALES - GENERAL: PAINLEVEL: NO PAIN (0)

## 2021-08-30 NOTE — NURSING NOTE
Labs: Patient was given results of the laboratory testing obtained today and patient was instructed about when to return for the next laboratory testing. CK, lipid with direct LDL, lipoprotein a, TSH, urine for albuminuria soon. Repeat CMP and lipid in 3-4 months with follow up    Med Reconcile: Reviewed and verified all current medications with the patient. The updated medication list was printed and given to the patient. START amlodipine 5mg once daily at bedtime.    Return Appointment: Patient given instructions regarding scheduling next clinic visit. RTC to see DR. Smallwood in 3-4 months  *Renal artery ultrasound  *Carotid ultrasound  *14 day ziopatch today    Patient stated he understood all health information given and agreed to call with further questions or concerns.   Natalie Springer RN

## 2021-08-30 NOTE — PROGRESS NOTES
Per GAEL Salazar, patient to have 14 day Zio patch monitor placed.  Diagnosis: Syncope R55  Monitor placed: Yes  Patient Instructed: Yes  Patient verbalized understanding: Yes  Holter # [id number]    Placed by Som Greco

## 2021-08-30 NOTE — LETTER
8/30/2021      RE: Yasir Hines  [address] [facility] [address]       Dear Colleague,    Thank you for the opportunity to participate in the care of your patient, Yasir Hines, at the [facility] [place] at [facility]. Please see a copy of my visit note below.    HPI:     I have the privilege to evaluate and examine Mr Yasir Hines, who is a 61 yr old male patient with a Hx of hypercholesterolemia, hypertension who has been very active and complaints about palpitations and rest and during effort    On 07-22-21 patient developed a syncope and was seen at ER in [place] - (results in care everywhere and got ten a cardiovascular work up in [facility] - see echocardiogram/labs).    He has been an avid runner - because of the palpitations he has reduced his effort.  Patient denies chest patient, shortness of breath and intermittent claudication.  Patient has hypertension and hypercholesterolemia.    Patient has never smoked.  Patient 5 Standard drinks or equivalent per week.          PAST MEDICAL HISTORY:  Hypertension  Hypercholesterolemia  Syncope, collapse  Lumbar radiculopathy      Herpes zoster without complication 1/12/2018   Left back T9 dermatomal distribution     Hypercholesteremia 2/12/2010     LTBI (latent tuberculosis infection) 5/4/2017     Onychomycosis 2/12/2010     Palpitations 2/12/2010     Prostate cancer (HC) 01/2014       CURRENT MEDICATIONS:  Current Outpatient Medications   Medication Sig Dispense Refill     amLODIPine (NORVASC) 5 MG tablet Take 1 tablet (5 mg) by mouth daily 90 tablet 1     aspirin (ASA) 81 MG EC tablet Take 1 tablet (81 mg) by mouth daily 90 tablet 1     atorvastatin (LIPITOR) 40 MG tablet Take 1 tablet by mouth daily       Glucosamine Sulfate 1000 MG TABS        losartan (COZAAR) 100 MG tablet Take 1 tablet (100 mg) by mouth daily 90 tablet 1     omega 3 1000 MG CAPS        tiZANidine (ZANAFLEX) 2  MG tablet Take 1 tablet (2 mg) by mouth 3 times daily as needed for muscle spasms 30 tablet 0     ibuprofen (ADVIL/MOTRIN) 100 MG tablet Take 100 mg by mouth every 4 hours as needed         PAST SURGICAL HISTORY:    CARPAL TUNNEL RELEASE Right 2007     COLONOSCOPY 04/29/09   [facility] repeat 5 years     COLONOSCOPY SCREENING 2001 2004     HEMORRHOIDECTOMY 06/98     TRIGGER FINGER RELEASE Right   In thumb        ALLERGIES   No Known Allergies    FAMILY HISTORY:    Family History of Colon Cancer, Father, one Grandmother, last exam 5/12/14, one Sessile serrated adenoma; 5 yr interval 3/10/2010     Family History of Pancreatic Cancer, Maternal GM 3/10/2010       SOCIAL HISTORY:  Social History     Socioeconomic History     Marital status:      Spouse name: None     Number of children: None     Years of education: None     Highest education level: None   Occupational History     None   Tobacco Use     Smoking status: Never Smoker     Smokeless tobacco: Never Used   Substance and Sexual Activity     Alcohol use: Yes     Comment: occasionally     Drug use: None     Sexual activity: None   Other Topics Concern     None   Social History Narrative     None     Social Determinants of Health     Financial Resource Strain:      Difficulty of Paying Living Expenses:    Food Insecurity:      Worried About Running Out of Food in the Last Year:      Ran Out of Food in the Last Year:    Transportation Needs:      Lack of Transportation (Medical):      Lack of Transportation (Non-Medical):    Physical Activity:      Days of Exercise per Week:      Minutes of Exercise per Session:    Stress:      Feeling of Stress :    Social Connections:      Frequency of Communication with Friends and Family:      Frequency of Social Gatherings with Friends and Family:      Attends Holiness Services:      Active Member of Clubs or Organizations:      Attends Club or Organization Meetings:      Marital Status:    Intimate Partner Violence:       "Fear of Current or Ex-Partner:      Emotionally Abused:      Physically Abused:      Sexually Abused:        ROS:   Constitutional: No fever, chills, or sweats. No weight gain/loss   ENT: No visual disturbance, ear ache, epistaxis, sore throat  Allergies/Immunologic: Negative.   Respiratory: No cough, hemoptysia  Cardiovascular: As per HPI  GI: No nausea, vomiting, hematemesis, melena, or hematochezia  : No urinary frequency, dysuria, or hematuria  Integument: Negative  Psychiatric: Negative  Neuro: Negative  Endocrinology: Negative   Musculoskeletal: Negative    EXAM:  Ht 1.803 m (5' 11\")   Wt 95.3 kg (210 lb)   BMI 29.29 kg/m    In general, the patient is a pleasant male in no apparent distress.    HEENT: NC/AT.  PERRLA.  EOMI.  Sclerae white, not injected.  Nares clear.  Pharynx without erythema or exudate.  Dentition intact.    Neck: No adenopathy.  No thyromegaly. Carotids +4/4 bilaterally without bruits.  No jugular venous distension.   Heart: ectopic beats during auscultation.  systolic murmur R2, No murmur, rub, click, or gallop. The PMI is in the 5th ICS in the midclavicular line. There is no heave.    Lungs: CTA.  No ronchi, wheezes, rales.  No dullness to percussion.   Abdomen: Soft, nontender, nondistended. No organomegaly.  No bruits.   Extremities: No clubbing, cyanosis, or edema.  The pulses are +4/4 at the radial, brachial, femoral, popliteal, DP, and PT sites bilaterally.  No bruits are noted.  Neurologic: Alert and oriented to person/place/time, normal speech, gait and affect  Skin: No petechiae, purpura or rash.    Labs:  LIPID RESULTS:  Lab Results   Component Value Date    CHOL 216 (H) 09/02/2021    HDL 46 09/02/2021     (H) 09/02/2021    TRIG 95 09/02/2021    NHDL 170 (H) 09/02/2021     SODIUM 135 - 145 mmol/L 137    POTASSIUM 3.5 - 5.0 mmol/L 4.2    CHLORIDE 98 - 110 mmol/L 106    CO2,TOTAL 21 - 31 mmol/L 26    ANION GAP 5 - 18                  5    GLUCOSE 65 - 100 mg/dL 109High   "   CALCIUM 8.5 - 10.5 mg/dL 9.6    BUN 8 - 25 mg/dL 16    CREATININE 0.72 - 1.25 mg/dL 1.21    BUN/CREAT RATIO           10 - 20                  13    GFR if African American >60 ml/min/1.73m2 >60              Ref. Range 9/2/2021 07:08   Cholesterol Latest Ref Range: <200 mg/dL 216 (H)   CK Total Latest Ref Range: 30 - 300 U/L 152   Patient Fasting > 8hrs? Unknown Yes   HDL Cholesterol Latest Ref Range: >=40 mg/dL 46   LDL Cholesterol Calculated Latest Ref Range: <=100 mg/dL 151 (H)   Lipoprotein (a) Latest Ref Range: <=29 mg/dL 12   Non HDL Cholesterol Latest Ref Range: <130 mg/dL 170 (H)   Triglycerides Latest Ref Range: <150 mg/dL 95   TSH Latest Ref Range: 0.40 - 4.00 mU/L 1.11        Ref. Range 9/2/2021 08:16   Albumin Urine mg/g Cr Unknown See Comment   Albumin Urine mg/L Latest Units: mg/L <5   Creatinine Urine Latest Units: mg/dL 66         LIVER ENZYME RESULTS:  No results found for: AST, ALT    CBC RESULTS:  No results found for: WBC, RBC, HGB, HCT, MCV, MCH, MCHC, RDW, PLT    BMP RESULTS:  No results found for: NA, POTASSIUM, CHLORIDE, CO2, ANIONGAP, GLC, BUN, CR, GFRESTIMATED, GFRESTBLACK, PAMELA     A1C RESULTS:  No results found for: A1C    INR RESULTS:  No results found for: INR    Procedures:      EKG 08-30-21  Sin rhythm, sinus arrythmia    Echocardiogram 08-31-21  1. Normal left ventricular chamber size.  Estimated left ventricular ejection fraction is   60-65%.    2. No regional wall motion abnormalities.    3. Mild calcific aortic valve stenosis.  Aortic valve systolic peak velocity is 2.3 m/sec.     Aortic valve systolic mean gradient is 11.4    mmHg.    4. Severe left atrial enlargement.    5. Estimated right ventricular systolic pressure is 33.4 mmHg.      Estimated EF: 60-65%      FINDINGS    Left Ventricle Normal left ventricular chamber size. Normal left ventricular systolic   function. No regional wall motion    abnormalities. Basal ventricular septal thickening. Basal ventricular septal  thickening (mild,   1.2cm). Estimated left    ventricular ejection fraction is 60-65%. No regional wall motion abnormalities.    Diastolic Function Findings consistent with normal left ventricular filling pressure.    Right Ventricle Normal right ventricular chamber size. Normal right ventricular systolic   function. Normal TAPSE consistent with    normal right ventricular longitudinal systolic function. Estimated right ventricular systolic   pressure is 33.4    mmHg.    Left Atrium Left atrial volume index is 53.7 ml/m . Severe left atrial enlargement.    Right Atrium Normal right atrial size.    Atrial Septum No evidence of inter-atrial shunt by color flow Doppler.    Aortic Valve Mildly calcified aortic valve. Tricuspid aortic valve. Mild calcific aortic valve   stenosis. Aortic valve systolic peak    velocity is 2.3 m/sec. Aortic valve systolic mean gradient is 11.4 mmHg.    Mitral Valve Normal mitral valve.    Tricuspid Valve Normal tricuspid valve. Mild tricuspid valve regurgitation.    Pulmonic Valve Pulmonary valve was not well visualized. Mild pulmonary valve regurgitation.    Pericardium No pericardial effusion.    Aorta Normal aortic sinus of Valsalva dimension (3.4 cm).    Inferior Vena Cava Normal inferior vena cava with normal inspiratory collapse.    MEASUREMENTS  (Male / Female) Normal Values    2D MEASUREMENTS AND LV FUNCTION    IVS Diastolic Thickness           1.24 cm               < 1.1 cm / < 1.0 cm    LV Diastolic Diameter PLAX        4.88 cm               4.2 - 5.9 / 3.9 - 5.3 cm    LV Diastolic Diameter Index       2.3 cm/m     LVPW Diastolic Thickness          0.979 cm              < 1.1 cm / < 1.0 cm    LV Systolic Diameter PLAX         2.93 cm    LV Systolic Diameter Index        1.38 cm/m     LVOT Diameter                     2.3 cm    LVOT Cardiac Output               6.02 l/min    LVOT Cardiac Index                2.76 l/min m     LVOT Stroke Volume                111 ml    Stroke  Volume Index               51.1 ml/m     LA Area 4C View                   28.1 cm     LA Length 4C                      6.23 cm    LA Area 2C View                   32.2 cm     LA Length 2C                      6.5 cm    LA Volume MOD BP                  114 ml    LA Volume Index MOD BP            53.7 ml/m             16 - 34 ml/m     RV Diastolic Basal Diameter       4.65 cm    RV Diastolic Mid Diameter         3.55 cm    LV Mass                           201 g    LV Mass Index                     93.3 g/m     Sinuses of Valsalva Diameter(d)   3.4 cm    Ascending Aorta Diameter(s)       3.5 cm                < 3.7 cm    IVC Diameter Expiration           1.77 cm      M MODE    TAPSE MM                          1.89 cm      DIASTOLOGY    Mitral E Point Velocity           0.597 m/sec           0.70 - 1.02 m/sec    Mitral A Point Velocity           0.577 m/sec           0.06 - 1.06 m/sec    Mitral E to A Ratio               1.04                  1.1 - 2.1    MV Deceleration Time              252 msec              167 - 231 msec    Pulmonary Vein Systolic Velocity  56.2 cm/sec    Pulmonary Vein Diastolic Velocit  47.3 cm/sec    LV E' Lateral Velocity            0.106 m/sec    Mitral E to LV E' Lateral Ratio   5.63    LV E' Septal Velocity             0.0868 m/sec    Mitral E to LV E' Septal Ratio    6.88      AORTIC VALVE    AV Peak Velocity                  2.31 m/sec            < 2.0 m/sec    AV Peak Gradient                  21.3 mmHg    AV Mean Gradient                  11.4 mmHg    AV Velocity Time Integral         50.3 cm    LVOT Peak Velocity                1.25 m/sec    LVOT Velocity Time Integral       26.8 cm    AV Area Cont Eq vti               2.22 cm     AV Area Cont Eq pk                2.25 cm     AV Dimensionless Index            0.533      MITRAL VALVE    MV Pressure Half Time             74 msec    MV Area PHT                       2.97 cm       TRICUSPID VALVE AND ESTIMATED PRESSURES    TR Peak  Velocity                  2.76 m/sec    TR Peak Gradient                  33 mmHg    Right Atrial Pressure             3 mmHg    Right Ventricular Systolic Press  33.4 mmHg      PULMONIC VALVE    PV Peak Velocity                  1.4 m/sec      HCM DATA    LVOT SANJUANA (r)                      6.23 mmHg      Aortic Root ZScore: -1.08      Exam: Bilateral carotid duplex Doppler ultrasound dated 9/2/2021 8:18  AM     Clinical history: CAD (coronary artery disease); HTN (hypertension)     Comparison Study: None available.     Ordering provider: EFREN LEE     Technique: Grayscale (B-mode) and duplex and spectral Doppler  ultrasound of the extracranial internal carotid, external carotid,  vertebral artery origins, right brachiocephalic/subclavian and left  subclavian arteries. Velocity measurements obtained with angle  correction at or less than 60 degrees.     Findings:     Right side:      Plaque Morphology: None.        Proximal CCA: 102/22 cm/sec     Mid CCA: 87/21 cm/sec     Distal CCA: 89/24 cm/sec     Carotid bifurcation: 59/20 cm/sec     External CA: 97/16 cm/sec        Proximal ICA: 106/35 cm/sec     Mid ICA: 87/42 cm/sec     Distal ICA: 76/25 cm/sec        Vertebral artery: 47/18 cm/sec, antegrade     Innominate artery: 95/6 cm/sec     Proximal subclavian: 116/0 cm/sec     ICA/CCA ratio: 1.2     Left side:      Plaque Morphology: None        CCA origin:      Proximal CCA: 89/16 cm/sec     Mid CCA: 100/20 cm/sec     Distal CCA: 101/21 cm/sec     Carotid bifurcation: 80/16 cm/sec     External CA: 113/9 cm/sec        Proximal ICA: 87/23 cm/sec     Mid ICA: 63/17 cm/sec     Distal ICA: 68/25 cm/sec        Vertebral artery: 48/13 cm/sec, antegrade      Subclavian origin: 101/0 cm/sec     Proximal subclavian: 122/0 cm/sec     ICA/CCA ratio: 0.9                                                                      Impression:     1. Right side      Degree of stenosis of the internal carotid artery: Normal.     2.  Left side:          Degree of stenosis of the internal carotid artery: Normal.      CT Brain 07-22-21   EXAM: CT HEAD BRAIN WO   LOCATION: [facility] [place]   DATE/TIME: 7/22/2021 2:50 PM     INDICATION: Syncope.   COMPARISON: None.   TECHNIQUE: Routine CT Head without IV contrast. Multiplanar reformats. Dose reduction techniques were used.     FINDINGS:   INTRACRANIAL CONTENTS: No intracranial hemorrhage, extraaxial collection, or mass effect.  No CT evidence of acute infarct. Normal parenchymal attenuation. Normal ventricles and sulci.     VISUALIZED ORBITS/SINUSES/MASTOIDS: No intraorbital abnormality. No paranasal sinus mucosal disease. No middle ear or mastoid effusion.     BONES/SOFT TISSUES: Minimal soft tissue swelling involving the right parietal scalp. No calvarial fracture.     IMPRESSION:   1.  Minimal right parietal scalp soft tissue swelling.   2.  No acute intracranial process.      Exam: Duplex Doppler ultrasound of the kidneys and bilateral renal arteries dated 9/2/2021 8:18 AM.     Comparison Study: None     Clinical Information: Coronary artery disease and hypertension.     Technique: B-mode (grayscale) and duplex Doppler evaluation of the  abdominal aorta and renal arteries performed. Velocity measurements  obtained with angle correction at or less than 60 degrees.     Ordering provider: EFREN LEE     Findings:     The right kidney measures 11.2 cm in length. The left kidney measures  11.8 cm in length. Cortical thickness and echogenicity is normal. No  evidence of stones, masses or hydronephrosis.     Peak systolic velocities in the abdominal aorta are:      126/0 cm/sec in the suprarenal aorta, triphasic   115/0 cm/sec in the infrarenal proximal aorta, triphasic   113/0 cm/sec in the infrarenal distal aorta, triphasic     Right renal artery velocities:     Origin: 127/32 cm/sec   Proximal renal artery: 132/39 cm/sec   Mid renal artery: 68/29   Hilum/distal renal artery: 58/18  cm/sec     Resistive indices in the arcuate arteries:      Inferior pole: 0.6  Mid pole: 0.6  Superior pole: 0.5     Renal artery to aorta ratio: 1.0     The right renal vein is patent and demonstrates phasicity.     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                                                                      Impression:     1. Right kidney:     1a. Renal parenchyma: Normal.  1b. Renal artery: Patent with no evidence for stenosis.     2. Left kidney:     2a. Renal parenchyma: Normal.  2b. Renal artery: Patent with no evidence for stenosis.     [place]  08-30-21    Sin rhythm, avg HR 65 bpm, max , min HR 39, 6 periods SVT, idioventricular ryhtm , isolated SVES 5.3 %, < 1 % SVES, rare VES, couplets and triplets.         Assessment and Plan:     We discussed the results with patient.  We discussed the importance of a heart healthy diet and lifestyle.  We also discussed the importance of no drinks - because this can induce some arrhythmias.  We disucssed following changes in medicla therapy.  Start amlodipine 5mg at bedtime.  Start losartan 100mg once daily  Start aspirin 81mg once daily     Follow-up:  Follow up with Dr Smallwood in 3-4 months with labs prior.    Jerry Smallwood MD, PhD  Professor of Medicine  Division of Cardiology          CC  Patient Care Team:  Hay Melendez as PCP - General (Internal Medicine)  Jrery Smallwood MD as MD (Cardiovascular Disease)  SELF, REFERRED      Please do not hesitate to contact me if you have any questions/concerns.     Sincerely,     Jerry Smallwood MD

## 2021-08-30 NOTE — LETTER
8/30/2021      RE: Yasir Hines  [address] [facility] [address]       HPI:     I have the privilege to evaluate and examine Mr Yasir Hines, who is a 61 yr old male patient with a Hx of hypercholesterolemia, hypertension who has been very active and complaints about palpitations and rest and during effort    On 07-22-21 patient developed a syncope and was seen at ER in [place] - (results in care everywhere and got ten a cardiovascular work up in [facility] - see echocardiogram/labs).    He has been an avid runner - because of the palpitations he has reduced his effort.  Patient denies chest patient, shortness of breath and intermittent claudication.  Patient has hypertension and hypercholesterolemia.    Patient has never smoked.  Patient 5 Standard drinks or equivalent per week.          PAST MEDICAL HISTORY:  Hypertension  Hypercholesterolemia  Syncope, collapse  Lumbar radiculopathy      Herpes zoster without complication 1/12/2018   Left back T9 dermatomal distribution     Hypercholesteremia 2/12/2010     LTBI (latent tuberculosis infection) 5/4/2017     Onychomycosis 2/12/2010     Palpitations 2/12/2010     Prostate cancer (HC) 01/2014       CURRENT MEDICATIONS:  Current Outpatient Medications   Medication Sig Dispense Refill     amLODIPine (NORVASC) 5 MG tablet Take 1 tablet (5 mg) by mouth daily 90 tablet 1     aspirin (ASA) 81 MG EC tablet Take 1 tablet (81 mg) by mouth daily 90 tablet 1     atorvastatin (LIPITOR) 40 MG tablet Take 1 tablet by mouth daily       Glucosamine Sulfate 1000 MG TABS        losartan (COZAAR) 100 MG tablet Take 1 tablet (100 mg) by mouth daily 90 tablet 1     omega 3 1000 MG CAPS        tiZANidine (ZANAFLEX) 2 MG tablet Take 1 tablet (2 mg) by mouth 3 times daily as needed for muscle spasms 30 tablet 0     ibuprofen (ADVIL/MOTRIN) 100 MG tablet Take 100 mg by mouth every 4 hours as needed         PAST SURGICAL HISTORY:    CARPAL TUNNEL RELEASE Right 2007      COLONOSCOPY 04/29/09   [facility] repeat 5 years     COLONOSCOPY SCREENING 2001 2004     HEMORRHOIDECTOMY 06/98     TRIGGER FINGER RELEASE Right   In thumb        ALLERGIES   No Known Allergies    FAMILY HISTORY:    Family History of Colon Cancer, Father, one Grandmother, last exam 5/12/14, one Sessile serrated adenoma; 5 yr interval 3/10/2010     Family History of Pancreatic Cancer, Maternal GM 3/10/2010       SOCIAL HISTORY:  Social History     Socioeconomic History     Marital status:      Spouse name: None     Number of children: None     Years of education: None     Highest education level: None   Occupational History     None   Tobacco Use     Smoking status: Never Smoker     Smokeless tobacco: Never Used   Substance and Sexual Activity     Alcohol use: Yes     Comment: occasionally     Drug use: None     Sexual activity: None   Other Topics Concern     None   Social History Narrative     None     Social Determinants of Health     Financial Resource Strain:      Difficulty of Paying Living Expenses:    Food Insecurity:      Worried About Running Out of Food in the Last Year:      Ran Out of Food in the Last Year:    Transportation Needs:      Lack of Transportation (Medical):      Lack of Transportation (Non-Medical):    Physical Activity:      Days of Exercise per Week:      Minutes of Exercise per Session:    Stress:      Feeling of Stress :    Social Connections:      Frequency of Communication with Friends and Family:      Frequency of Social Gatherings with Friends and Family:      Attends Jew Services:      Active Member of Clubs or Organizations:      Attends Club or Organization Meetings:      Marital Status:    Intimate Partner Violence:      Fear of Current or Ex-Partner:      Emotionally Abused:      Physically Abused:      Sexually Abused:        ROS:   Constitutional: No fever, chills, or sweats. No weight gain/loss   ENT: No visual disturbance, ear ache, epistaxis, sore  "throat  Allergies/Immunologic: Negative.   Respiratory: No cough, hemoptysia  Cardiovascular: As per HPI  GI: No nausea, vomiting, hematemesis, melena, or hematochezia  : No urinary frequency, dysuria, or hematuria  Integument: Negative  Psychiatric: Negative  Neuro: Negative  Endocrinology: Negative   Musculoskeletal: Negative    EXAM:  Ht 1.803 m (5' 11\")   Wt 95.3 kg (210 lb)   BMI 29.29 kg/m    In general, the patient is a pleasant male in no apparent distress.    HEENT: NC/AT.  PERRLA.  EOMI.  Sclerae white, not injected.  Nares clear.  Pharynx without erythema or exudate.  Dentition intact.    Neck: No adenopathy.  No thyromegaly. Carotids +4/4 bilaterally without bruits.  No jugular venous distension.   Heart: ectopic beats during auscultation.  systolic murmur R2, No murmur, rub, click, or gallop. The PMI is in the 5th ICS in the midclavicular line. There is no heave.    Lungs: CTA.  No ronchi, wheezes, rales.  No dullness to percussion.   Abdomen: Soft, nontender, nondistended. No organomegaly.  No bruits.   Extremities: No clubbing, cyanosis, or edema.  The pulses are +4/4 at the radial, brachial, femoral, popliteal, DP, and PT sites bilaterally.  No bruits are noted.  Neurologic: Alert and oriented to person/place/time, normal speech, gait and affect  Skin: No petechiae, purpura or rash.    Labs:  LIPID RESULTS:  Lab Results   Component Value Date    CHOL 216 (H) 09/02/2021    HDL 46 09/02/2021     (H) 09/02/2021    TRIG 95 09/02/2021    NHDL 170 (H) 09/02/2021     SODIUM 135 - 145 mmol/L 137    POTASSIUM 3.5 - 5.0 mmol/L 4.2    CHLORIDE 98 - 110 mmol/L 106    CO2,TOTAL 21 - 31 mmol/L 26    ANION GAP 5 - 18                  5    GLUCOSE 65 - 100 mg/dL 109High     CALCIUM 8.5 - 10.5 mg/dL 9.6    BUN 8 - 25 mg/dL 16    CREATININE 0.72 - 1.25 mg/dL 1.21    BUN/CREAT RATIO           10 - 20                  13    GFR if African American >60 ml/min/1.73m2 >60              Ref. Range 9/2/2021 07:08 "   Cholesterol Latest Ref Range: <200 mg/dL 216 (H)   CK Total Latest Ref Range: 30 - 300 U/L 152   Patient Fasting > 8hrs? Unknown Yes   HDL Cholesterol Latest Ref Range: >=40 mg/dL 46   LDL Cholesterol Calculated Latest Ref Range: <=100 mg/dL 151 (H)   Lipoprotein (a) Latest Ref Range: <=29 mg/dL 12   Non HDL Cholesterol Latest Ref Range: <130 mg/dL 170 (H)   Triglycerides Latest Ref Range: <150 mg/dL 95   TSH Latest Ref Range: 0.40 - 4.00 mU/L 1.11        Ref. Range 9/2/2021 08:16   Albumin Urine mg/g Cr Unknown See Comment   Albumin Urine mg/L Latest Units: mg/L <5   Creatinine Urine Latest Units: mg/dL 66         LIVER ENZYME RESULTS:  No results found for: AST, ALT    CBC RESULTS:  No results found for: WBC, RBC, HGB, HCT, MCV, MCH, MCHC, RDW, PLT    BMP RESULTS:  No results found for: NA, POTASSIUM, CHLORIDE, CO2, ANIONGAP, GLC, BUN, CR, GFRESTIMATED, GFRESTBLACK, PAMELA     A1C RESULTS:  No results found for: A1C    INR RESULTS:  No results found for: INR    Procedures:      EKG 08-30-21  Sin rhythm, sinus arrythmia    Echocardiogram 08-31-21  1. Normal left ventricular chamber size.  Estimated left ventricular ejection fraction is   60-65%.    2. No regional wall motion abnormalities.    3. Mild calcific aortic valve stenosis.  Aortic valve systolic peak velocity is 2.3 m/sec.     Aortic valve systolic mean gradient is 11.4    mmHg.    4. Severe left atrial enlargement.    5. Estimated right ventricular systolic pressure is 33.4 mmHg.      Estimated EF: 60-65%      FINDINGS    Left Ventricle Normal left ventricular chamber size. Normal left ventricular systolic   function. No regional wall motion    abnormalities. Basal ventricular septal thickening. Basal ventricular septal thickening (mild,   1.2cm). Estimated left    ventricular ejection fraction is 60-65%. No regional wall motion abnormalities.    Diastolic Function Findings consistent with normal left ventricular filling pressure.    Right Ventricle Normal  right ventricular chamber size. Normal right ventricular systolic   function. Normal TAPSE consistent with    normal right ventricular longitudinal systolic function. Estimated right ventricular systolic   pressure is 33.4    mmHg.    Left Atrium Left atrial volume index is 53.7 ml/m . Severe left atrial enlargement.    Right Atrium Normal right atrial size.    Atrial Septum No evidence of inter-atrial shunt by color flow Doppler.    Aortic Valve Mildly calcified aortic valve. Tricuspid aortic valve. Mild calcific aortic valve   stenosis. Aortic valve systolic peak    velocity is 2.3 m/sec. Aortic valve systolic mean gradient is 11.4 mmHg.    Mitral Valve Normal mitral valve.    Tricuspid Valve Normal tricuspid valve. Mild tricuspid valve regurgitation.    Pulmonic Valve Pulmonary valve was not well visualized. Mild pulmonary valve regurgitation.    Pericardium No pericardial effusion.    Aorta Normal aortic sinus of Valsalva dimension (3.4 cm).    Inferior Vena Cava Normal inferior vena cava with normal inspiratory collapse.    MEASUREMENTS  (Male / Female) Normal Values    2D MEASUREMENTS AND LV FUNCTION    IVS Diastolic Thickness           1.24 cm               < 1.1 cm / < 1.0 cm    LV Diastolic Diameter PLAX        4.88 cm               4.2 - 5.9 / 3.9 - 5.3 cm    LV Diastolic Diameter Index       2.3 cm/m     LVPW Diastolic Thickness          0.979 cm              < 1.1 cm / < 1.0 cm    LV Systolic Diameter PLAX         2.93 cm    LV Systolic Diameter Index        1.38 cm/m     LVOT Diameter                     2.3 cm    LVOT Cardiac Output               6.02 l/min    LVOT Cardiac Index                2.76 l/min m     LVOT Stroke Volume                111 ml    Stroke Volume Index               51.1 ml/m     LA Area 4C View                   28.1 cm     LA Length 4C                      6.23 cm    LA Area 2C View                   32.2 cm     LA Length 2C                      6.5 cm    LA Volume MOD BP                   114 ml    LA Volume Index MOD BP            53.7 ml/m             16 - 34 ml/m     RV Diastolic Basal Diameter       4.65 cm    RV Diastolic Mid Diameter         3.55 cm    LV Mass                           201 g    LV Mass Index                     93.3 g/m     Sinuses of Valsalva Diameter(d)   3.4 cm    Ascending Aorta Diameter(s)       3.5 cm                < 3.7 cm    IVC Diameter Expiration           1.77 cm      M MODE    TAPSE MM                          1.89 cm      DIASTOLOGY    Mitral E Point Velocity           0.597 m/sec           0.70 - 1.02 m/sec    Mitral A Point Velocity           0.577 m/sec           0.06 - 1.06 m/sec    Mitral E to A Ratio               1.04                  1.1 - 2.1    MV Deceleration Time              252 msec              167 - 231 msec    Pulmonary Vein Systolic Velocity  56.2 cm/sec    Pulmonary Vein Diastolic Velocit  47.3 cm/sec    LV E' Lateral Velocity            0.106 m/sec    Mitral E to LV E' Lateral Ratio   5.63    LV E' Septal Velocity             0.0868 m/sec    Mitral E to LV E' Septal Ratio    6.88      AORTIC VALVE    AV Peak Velocity                  2.31 m/sec            < 2.0 m/sec    AV Peak Gradient                  21.3 mmHg    AV Mean Gradient                  11.4 mmHg    AV Velocity Time Integral         50.3 cm    LVOT Peak Velocity                1.25 m/sec    LVOT Velocity Time Integral       26.8 cm    AV Area Cont Eq vti               2.22 cm     AV Area Cont Eq pk                2.25 cm     AV Dimensionless Index            0.533      MITRAL VALVE    MV Pressure Half Time             74 msec    MV Area PHT                       2.97 cm       TRICUSPID VALVE AND ESTIMATED PRESSURES    TR Peak Velocity                  2.76 m/sec    TR Peak Gradient                  33 mmHg    Right Atrial Pressure             3 mmHg    Right Ventricular Systolic Press  33.4 mmHg      PULMONIC VALVE    PV Peak Velocity                  1.4 m/sec      HCM  DATA    LVOT SANJUANA (r)                      6.23 mmHg      Aortic Root ZScore: -1.08      Exam: Bilateral carotid duplex Doppler ultrasound dated 9/2/2021 8:18  AM     Clinical history: CAD (coronary artery disease); HTN (hypertension)     Comparison Study: None available.     Ordering provider: EFREN LEE     Technique: Grayscale (B-mode) and duplex and spectral Doppler  ultrasound of the extracranial internal carotid, external carotid,  vertebral artery origins, right brachiocephalic/subclavian and left  subclavian arteries. Velocity measurements obtained with angle  correction at or less than 60 degrees.     Findings:     Right side:      Plaque Morphology: None.        Proximal CCA: 102/22 cm/sec     Mid CCA: 87/21 cm/sec     Distal CCA: 89/24 cm/sec     Carotid bifurcation: 59/20 cm/sec     External CA: 97/16 cm/sec        Proximal ICA: 106/35 cm/sec     Mid ICA: 87/42 cm/sec     Distal ICA: 76/25 cm/sec        Vertebral artery: 47/18 cm/sec, antegrade     Innominate artery: 95/6 cm/sec     Proximal subclavian: 116/0 cm/sec     ICA/CCA ratio: 1.2     Left side:      Plaque Morphology: None        CCA origin:      Proximal CCA: 89/16 cm/sec     Mid CCA: 100/20 cm/sec     Distal CCA: 101/21 cm/sec     Carotid bifurcation: 80/16 cm/sec     External CA: 113/9 cm/sec        Proximal ICA: 87/23 cm/sec     Mid ICA: 63/17 cm/sec     Distal ICA: 68/25 cm/sec        Vertebral artery: 48/13 cm/sec, antegrade      Subclavian origin: 101/0 cm/sec     Proximal subclavian: 122/0 cm/sec     ICA/CCA ratio: 0.9                                                                      Impression:     1. Right side      Degree of stenosis of the internal carotid artery: Normal.     2. Left side:          Degree of stenosis of the internal carotid artery: Normal.      CT Brain 07-22-21   EXAM: CT HEAD BRAIN WO   LOCATION: [facility]   DATE/TIME: 7/22/2021 2:50 PM     INDICATION: Syncope.   COMPARISON: None.   TECHNIQUE:  Routine CT Head without IV contrast. Multiplanar reformats. Dose reduction techniques were used.     FINDINGS:   INTRACRANIAL CONTENTS: No intracranial hemorrhage, extraaxial collection, or mass effect.  No CT evidence of acute infarct. Normal parenchymal attenuation. Normal ventricles and sulci.     VISUALIZED ORBITS/SINUSES/MASTOIDS: No intraorbital abnormality. No paranasal sinus mucosal disease. No middle ear or mastoid effusion.     BONES/SOFT TISSUES: Minimal soft tissue swelling involving the right parietal scalp. No calvarial fracture.     IMPRESSION:   1.  Minimal right parietal scalp soft tissue swelling.   2.  No acute intracranial process.      Exam: Duplex Doppler ultrasound of the kidneys and bilateral renal arteries dated 9/2/2021 8:18 AM.     Comparison Study: None     Clinical Information: Coronary artery disease and hypertension.     Technique: B-mode (grayscale) and duplex Doppler evaluation of the  abdominal aorta and renal arteries performed. Velocity measurements  obtained with angle correction at or less than 60 degrees.     Ordering provider: EFREN LEE     Findings:     The right kidney measures 11.2 cm in length. The left kidney measures  11.8 cm in length. Cortical thickness and echogenicity is normal. No  evidence of stones, masses or hydronephrosis.     Peak systolic velocities in the abdominal aorta are:      126/0 cm/sec in the suprarenal aorta, triphasic   115/0 cm/sec in the infrarenal proximal aorta, triphasic   113/0 cm/sec in the infrarenal distal aorta, triphasic     Right renal artery velocities:     Origin: 127/32 cm/sec   Proximal renal artery: 132/39 cm/sec   Mid renal artery: 68/29   Hilum/distal renal artery: 58/18 cm/sec     Resistive indices in the arcuate arteries:      Inferior pole: 0.6  Mid pole: 0.6  Superior pole: 0.5     Renal artery to aorta ratio: 1.0     The right renal vein is patent and demonstrates phasicity.     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                                                                      Impression:     1. Right kidney:     1a. Renal parenchyma: Normal.  1b. Renal artery: Patent with no evidence for stenosis.     2. Left kidney:     2a. Renal parenchyma: Normal.  2b. Renal artery: Patent with no evidence for stenosis.     Ziopatch  08-30-21    Sin rhythm, avg HR 65 bpm, max , min HR 39, 6 periods SVT, idioventricular ryhtm , isolated SVES 5.3 %, < 1 % SVES, rare VES, couplets and triplets.         Assessment and Plan:     We discussed the results with patient.  We discussed the importance of a heart healthy diet and lifestyle.  We also discussed the importance of no drinks - because this can induce some arrhythmias.  We disucssed following changes in medicla therapy.  Start amlodipine 5mg at bedtime.  Start losartan 100mg once daily  Start aspirin 81mg once daily     Follow-up:  Follow up with Dr Smallwood in 3-4 months with labs prior.    Jerry Smallwood MD, PhD  Professor of Medicine  Division of Cardiology          CC  Patient Care Team:  Hay Melendez as PCP - General (Internal Medicine)

## 2021-08-30 NOTE — PATIENT INSTRUCTIONS
You were seen today in the Cardiovascular Clinic at the [facility].     Cardiology Providers you saw during your visit:  Dr Smallwood    Recommendations:  Carotid artery ultrasound. Renal artery ultrasound. ziopatch today. Labs today/soon.    Start amlodipine 5mg at bedtime.  Start losartan 100mg once daily  Start aspirin 81mg once daily    Follow-up:  Follow up with Dr Smallwood in 3-4 months with labs prior        For scheduling needs [phone] option 1 and the option 1 again.  Nursing questions: [phone] option 1 then chose option 4 for the triage nurse.  For emergencies call 911.    Thank you for your visit today!     Please call if you have any questions or concerns.    [facility] - [facility]

## 2021-08-31 LAB
ATRIAL RATE - MUSE: 61 BPM
DIASTOLIC BLOOD PRESSURE - MUSE: NORMAL MMHG
INTERPRETATION ECG - MUSE: NORMAL
P AXIS - MUSE: 63 DEGREES
PR INTERVAL - MUSE: 168 MS
QRS DURATION - MUSE: 98 MS
QT - MUSE: 418 MS
QTC - MUSE: 420 MS
R AXIS - MUSE: 46 DEGREES
SYSTOLIC BLOOD PRESSURE - MUSE: NORMAL MMHG
T AXIS - MUSE: 41 DEGREES
VENTRICULAR RATE- MUSE: 61 BPM

## 2021-09-02 ENCOUNTER — ANCILLARY PROCEDURE (OUTPATIENT)
Dept: ULTRASOUND IMAGING | Facility: CLINIC | Age: 61
End: 2021-09-02
Attending: INTERNAL MEDICINE
Payer: COMMERCIAL

## 2021-09-02 ENCOUNTER — LAB (OUTPATIENT)
Dept: LAB | Facility: CLINIC | Age: 61
End: 2021-09-02
Payer: COMMERCIAL

## 2021-09-02 DIAGNOSIS — I10 HTN (HYPERTENSION): ICD-10-CM

## 2021-09-02 DIAGNOSIS — I25.10 CAD (CORONARY ARTERY DISEASE): ICD-10-CM

## 2021-09-02 DIAGNOSIS — R55 SYNCOPE: ICD-10-CM

## 2021-09-02 LAB
CHOLEST SERPL-MCNC: 216 MG/DL
CK SERPL-CCNC: 152 U/L (ref 30–300)
CREAT UR-MCNC: 66 MG/DL
FASTING STATUS PATIENT QL REPORTED: YES
HDLC SERPL-MCNC: 46 MG/DL
LDLC SERPL CALC-MCNC: 151 MG/DL
MICROALBUMIN UR-MCNC: <5 MG/L
MICROALBUMIN/CREAT UR: NORMAL MG/G{CREAT}
NONHDLC SERPL-MCNC: 170 MG/DL
TRIGL SERPL-MCNC: 95 MG/DL
TSH SERPL DL<=0.005 MIU/L-ACNC: 1.11 MU/L (ref 0.4–4)

## 2021-09-02 PROCEDURE — 82550 ASSAY OF CK (CPK): CPT | Performed by: PATHOLOGY

## 2021-09-02 PROCEDURE — 93975 VASCULAR STUDY: CPT | Mod: GC | Performed by: RADIOLOGY

## 2021-09-02 PROCEDURE — 80061 LIPID PANEL: CPT | Performed by: PATHOLOGY

## 2021-09-02 PROCEDURE — 36415 COLL VENOUS BLD VENIPUNCTURE: CPT | Performed by: PATHOLOGY

## 2021-09-02 PROCEDURE — 83695 ASSAY OF LIPOPROTEIN(A): CPT | Mod: 90 | Performed by: PATHOLOGY

## 2021-09-02 PROCEDURE — 82043 UR ALBUMIN QUANTITATIVE: CPT | Performed by: PATHOLOGY

## 2021-09-02 PROCEDURE — 84443 ASSAY THYROID STIM HORMONE: CPT | Performed by: PATHOLOGY

## 2021-09-02 PROCEDURE — 93880 EXTRACRANIAL BILAT STUDY: CPT | Mod: GC | Performed by: RADIOLOGY

## 2021-09-03 LAB — LPA SERPL-MCNC: 12 MG/DL

## 2021-09-27 NOTE — PROGRESS NOTES
HPI:     I have the privilege to evaluate and examine Mr Yasir Hines, who is a 61 yr old male patient with a Hx of hypercholesterolemia, hypertension who has been very active and complaints about palpitations and rest and during effort    On 07-22-21 patient developed a syncope and was seen at ER in [place] - (results in care everywhere and got ten a cardiovascular work up in [facility] - see echocardiogram/labs).    He has been an avid runner - because of the palpitations he has reduced his effort.  Patient denies chest patient, shortness of breath and intermittent claudication.  Patient has hypertension and hypercholesterolemia.    Patient has never smoked.  Patient 5 Standard drinks or equivalent per week.          PAST MEDICAL HISTORY:  Hypertension  Hypercholesterolemia  Syncope, collapse  Lumbar radiculopathy      Herpes zoster without complication 1/12/2018   Left back T9 dermatomal distribution     Hypercholesteremia 2/12/2010     LTBI (latent tuberculosis infection) 5/4/2017     Onychomycosis 2/12/2010     Palpitations 2/12/2010     Prostate cancer (HC) 01/2014       CURRENT MEDICATIONS:  Current Outpatient Medications   Medication Sig Dispense Refill     amLODIPine (NORVASC) 5 MG tablet Take 1 tablet (5 mg) by mouth daily 90 tablet 1     aspirin (ASA) 81 MG EC tablet Take 1 tablet (81 mg) by mouth daily 90 tablet 1     atorvastatin (LIPITOR) 40 MG tablet Take 1 tablet by mouth daily       Glucosamine Sulfate 1000 MG TABS        losartan (COZAAR) 100 MG tablet Take 1 tablet (100 mg) by mouth daily 90 tablet 1     omega 3 1000 MG CAPS        tiZANidine (ZANAFLEX) 2 MG tablet Take 1 tablet (2 mg) by mouth 3 times daily as needed for muscle spasms 30 tablet 0     ibuprofen (ADVIL/MOTRIN) 100 MG tablet Take 100 mg by mouth every 4 hours as needed         PAST SURGICAL HISTORY:    CARPAL TUNNEL RELEASE Right 2007     COLONOSCOPY 04/29/09   [facility] repeat 5 years     COLONOSCOPY SCREENING 2001 2004      HEMORRHOIDECTOMY 06/98     TRIGGER FINGER RELEASE Right   In thumb        ALLERGIES   No Known Allergies    FAMILY HISTORY:    Family History of Colon Cancer, Father, one Grandmother, last exam 5/12/14, one Sessile serrated adenoma; 5 yr interval 3/10/2010     Family History of Pancreatic Cancer, Maternal GM 3/10/2010       SOCIAL HISTORY:  Social History     Socioeconomic History     Marital status:      Spouse name: None     Number of children: None     Years of education: None     Highest education level: None   Occupational History     None   Tobacco Use     Smoking status: Never Smoker     Smokeless tobacco: Never Used   Substance and Sexual Activity     Alcohol use: Yes     Comment: occasionally     Drug use: None     Sexual activity: None   Other Topics Concern     None   Social History Narrative     None     Social Determinants of Health     Financial Resource Strain:      Difficulty of Paying Living Expenses:    Food Insecurity:      Worried About Running Out of Food in the Last Year:      Ran Out of Food in the Last Year:    Transportation Needs:      Lack of Transportation (Medical):      Lack of Transportation (Non-Medical):    Physical Activity:      Days of Exercise per Week:      Minutes of Exercise per Session:    Stress:      Feeling of Stress :    Social Connections:      Frequency of Communication with Friends and Family:      Frequency of Social Gatherings with Friends and Family:      Attends Temple Services:      Active Member of Clubs or Organizations:      Attends Club or Organization Meetings:      Marital Status:    Intimate Partner Violence:      Fear of Current or Ex-Partner:      Emotionally Abused:      Physically Abused:      Sexually Abused:        ROS:   Constitutional: No fever, chills, or sweats. No weight gain/loss   ENT: No visual disturbance, ear ache, epistaxis, sore throat  Allergies/Immunologic: Negative.   Respiratory: No cough, hemoptysia  Cardiovascular: As per  "HPI  GI: No nausea, vomiting, hematemesis, melena, or hematochezia  : No urinary frequency, dysuria, or hematuria  Integument: Negative  Psychiatric: Negative  Neuro: Negative  Endocrinology: Negative   Musculoskeletal: Negative    EXAM:  Ht 1.803 m (5' 11\")   Wt 95.3 kg (210 lb)   BMI 29.29 kg/m    In general, the patient is a pleasant male in no apparent distress.    HEENT: NC/AT.  PERRLA.  EOMI.  Sclerae white, not injected.  Nares clear.  Pharynx without erythema or exudate.  Dentition intact.    Neck: No adenopathy.  No thyromegaly. Carotids +4/4 bilaterally without bruits.  No jugular venous distension.   Heart: ectopic beats during auscultation.  systolic murmur R2, No murmur, rub, click, or gallop. The PMI is in the 5th ICS in the midclavicular line. There is no heave.    Lungs: CTA.  No ronchi, wheezes, rales.  No dullness to percussion.   Abdomen: Soft, nontender, nondistended. No organomegaly.  No bruits.   Extremities: No clubbing, cyanosis, or edema.  The pulses are +4/4 at the radial, brachial, femoral, popliteal, DP, and PT sites bilaterally.  No bruits are noted.  Neurologic: Alert and oriented to person/place/time, normal speech, gait and affect  Skin: No petechiae, purpura or rash.    Labs:  LIPID RESULTS:  Lab Results   Component Value Date    CHOL 216 (H) 09/02/2021    HDL 46 09/02/2021     (H) 09/02/2021    TRIG 95 09/02/2021    NHDL 170 (H) 09/02/2021     SODIUM 135 - 145 mmol/L 137    POTASSIUM 3.5 - 5.0 mmol/L 4.2    CHLORIDE 98 - 110 mmol/L 106    CO2,TOTAL 21 - 31 mmol/L 26    ANION GAP 5 - 18                  5    GLUCOSE 65 - 100 mg/dL 109High     CALCIUM 8.5 - 10.5 mg/dL 9.6    BUN 8 - 25 mg/dL 16    CREATININE 0.72 - 1.25 mg/dL 1.21    BUN/CREAT RATIO           10 - 20                  13    GFR if African American >60 ml/min/1.73m2 >60              Ref. Range 9/2/2021 07:08   Cholesterol Latest Ref Range: <200 mg/dL 216 (H)   CK Total Latest Ref Range: 30 - 300 U/L 152 "   Patient Fasting > 8hrs? Unknown Yes   HDL Cholesterol Latest Ref Range: >=40 mg/dL 46   LDL Cholesterol Calculated Latest Ref Range: <=100 mg/dL 151 (H)   Lipoprotein (a) Latest Ref Range: <=29 mg/dL 12   Non HDL Cholesterol Latest Ref Range: <130 mg/dL 170 (H)   Triglycerides Latest Ref Range: <150 mg/dL 95   TSH Latest Ref Range: 0.40 - 4.00 mU/L 1.11        Ref. Range 9/2/2021 08:16   Albumin Urine mg/g Cr Unknown See Comment   Albumin Urine mg/L Latest Units: mg/L <5   Creatinine Urine Latest Units: mg/dL 66         LIVER ENZYME RESULTS:  No results found for: AST, ALT    CBC RESULTS:  No results found for: WBC, RBC, HGB, HCT, MCV, MCH, MCHC, RDW, PLT    BMP RESULTS:  No results found for: NA, POTASSIUM, CHLORIDE, CO2, ANIONGAP, GLC, BUN, CR, GFRESTIMATED, GFRESTBLACK, PAMELA     A1C RESULTS:  No results found for: A1C    INR RESULTS:  No results found for: INR    Procedures:      EKG 08-30-21  Sin rhythm, sinus arrythmia    Echocardiogram 08-31-21  1. Normal left ventricular chamber size.  Estimated left ventricular ejection fraction is   60-65%.    2. No regional wall motion abnormalities.    3. Mild calcific aortic valve stenosis.  Aortic valve systolic peak velocity is 2.3 m/sec.     Aortic valve systolic mean gradient is 11.4    mmHg.    4. Severe left atrial enlargement.    5. Estimated right ventricular systolic pressure is 33.4 mmHg.      Estimated EF: 60-65%      FINDINGS    Left Ventricle Normal left ventricular chamber size. Normal left ventricular systolic   function. No regional wall motion    abnormalities. Basal ventricular septal thickening. Basal ventricular septal thickening (mild,   1.2cm). Estimated left    ventricular ejection fraction is 60-65%. No regional wall motion abnormalities.    Diastolic Function Findings consistent with normal left ventricular filling pressure.    Right Ventricle Normal right ventricular chamber size. Normal right ventricular systolic   function. Normal TAPSE  consistent with    normal right ventricular longitudinal systolic function. Estimated right ventricular systolic   pressure is 33.4    mmHg.    Left Atrium Left atrial volume index is 53.7 ml/m . Severe left atrial enlargement.    Right Atrium Normal right atrial size.    Atrial Septum No evidence of inter-atrial shunt by color flow Doppler.    Aortic Valve Mildly calcified aortic valve. Tricuspid aortic valve. Mild calcific aortic valve   stenosis. Aortic valve systolic peak    velocity is 2.3 m/sec. Aortic valve systolic mean gradient is 11.4 mmHg.    Mitral Valve Normal mitral valve.    Tricuspid Valve Normal tricuspid valve. Mild tricuspid valve regurgitation.    Pulmonic Valve Pulmonary valve was not well visualized. Mild pulmonary valve regurgitation.    Pericardium No pericardial effusion.    Aorta Normal aortic sinus of Valsalva dimension (3.4 cm).    Inferior Vena Cava Normal inferior vena cava with normal inspiratory collapse.    MEASUREMENTS  (Male / Female) Normal Values    2D MEASUREMENTS AND LV FUNCTION    IVS Diastolic Thickness           1.24 cm               < 1.1 cm / < 1.0 cm    LV Diastolic Diameter PLAX        4.88 cm               4.2 - 5.9 / 3.9 - 5.3 cm    LV Diastolic Diameter Index       2.3 cm/m     LVPW Diastolic Thickness          0.979 cm              < 1.1 cm / < 1.0 cm    LV Systolic Diameter PLAX         2.93 cm    LV Systolic Diameter Index        1.38 cm/m     LVOT Diameter                     2.3 cm    LVOT Cardiac Output               6.02 l/min    LVOT Cardiac Index                2.76 l/min m     LVOT Stroke Volume                111 ml    Stroke Volume Index               51.1 ml/m     LA Area 4C View                   28.1 cm     LA Length 4C                      6.23 cm    LA Area 2C View                   32.2 cm     LA Length 2C                      6.5 cm    LA Volume MOD BP                  114 ml    LA Volume Index MOD BP            53.7 ml/m             16 - 34 ml/m      RV Diastolic Basal Diameter       4.65 cm    RV Diastolic Mid Diameter         3.55 cm    LV Mass                           201 g    LV Mass Index                     93.3 g/m     Sinuses of Valsalva Diameter(d)   3.4 cm    Ascending Aorta Diameter(s)       3.5 cm                < 3.7 cm    IVC Diameter Expiration           1.77 cm      M MODE    TAPSE MM                          1.89 cm      DIASTOLOGY    Mitral E Point Velocity           0.597 m/sec           0.70 - 1.02 m/sec    Mitral A Point Velocity           0.577 m/sec           0.06 - 1.06 m/sec    Mitral E to A Ratio               1.04                  1.1 - 2.1    MV Deceleration Time              252 msec              167 - 231 msec    Pulmonary Vein Systolic Velocity  56.2 cm/sec    Pulmonary Vein Diastolic Velocit  47.3 cm/sec    LV E' Lateral Velocity            0.106 m/sec    Mitral E to LV E' Lateral Ratio   5.63    LV E' Septal Velocity             0.0868 m/sec    Mitral E to LV E' Septal Ratio    6.88      AORTIC VALVE    AV Peak Velocity                  2.31 m/sec            < 2.0 m/sec    AV Peak Gradient                  21.3 mmHg    AV Mean Gradient                  11.4 mmHg    AV Velocity Time Integral         50.3 cm    LVOT Peak Velocity                1.25 m/sec    LVOT Velocity Time Integral       26.8 cm    AV Area Cont Eq vti               2.22 cm     AV Area Cont Eq pk                2.25 cm     AV Dimensionless Index            0.533      MITRAL VALVE    MV Pressure Half Time             74 msec    MV Area PHT                       2.97 cm       TRICUSPID VALVE AND ESTIMATED PRESSURES    TR Peak Velocity                  2.76 m/sec    TR Peak Gradient                  33 mmHg    Right Atrial Pressure             3 mmHg    Right Ventricular Systolic Press  33.4 mmHg      PULMONIC VALVE    PV Peak Velocity                  1.4 m/sec      HCM DATA    LVOT SANJUANA (r)                      6.23 mmHg      Aortic Root ZScore: -1.08       Exam: Bilateral carotid duplex Doppler ultrasound dated 9/2/2021 8:18  AM     Clinical history: CAD (coronary artery disease); HTN (hypertension)     Comparison Study: None available.     Ordering provider: EFREN LEE     Technique: Grayscale (B-mode) and duplex and spectral Doppler  ultrasound of the extracranial internal carotid, external carotid,  vertebral artery origins, right brachiocephalic/subclavian and left  subclavian arteries. Velocity measurements obtained with angle  correction at or less than 60 degrees.     Findings:     Right side:      Plaque Morphology: None.        Proximal CCA: 102/22 cm/sec     Mid CCA: 87/21 cm/sec     Distal CCA: 89/24 cm/sec     Carotid bifurcation: 59/20 cm/sec     External CA: 97/16 cm/sec        Proximal ICA: 106/35 cm/sec     Mid ICA: 87/42 cm/sec     Distal ICA: 76/25 cm/sec        Vertebral artery: 47/18 cm/sec, antegrade     Innominate artery: 95/6 cm/sec     Proximal subclavian: 116/0 cm/sec     ICA/CCA ratio: 1.2     Left side:      Plaque Morphology: None        CCA origin:      Proximal CCA: 89/16 cm/sec     Mid CCA: 100/20 cm/sec     Distal CCA: 101/21 cm/sec     Carotid bifurcation: 80/16 cm/sec     External CA: 113/9 cm/sec        Proximal ICA: 87/23 cm/sec     Mid ICA: 63/17 cm/sec     Distal ICA: 68/25 cm/sec        Vertebral artery: 48/13 cm/sec, antegrade      Subclavian origin: 101/0 cm/sec     Proximal subclavian: 122/0 cm/sec     ICA/CCA ratio: 0.9                                                                      Impression:     1. Right side      Degree of stenosis of the internal carotid artery: Normal.     2. Left side:          Degree of stenosis of the internal carotid artery: Normal.      CT Brain 07-22-21   EXAM: CT HEAD BRAIN WO   LOCATION: [facility] [place]   DATE/TIME: 7/22/2021 2:50 PM     INDICATION: Syncope.   COMPARISON: None.   TECHNIQUE: Routine CT Head without IV contrast. Multiplanar reformats. Dose reduction techniques  were used.     FINDINGS:   INTRACRANIAL CONTENTS: No intracranial hemorrhage, extraaxial collection, or mass effect.  No CT evidence of acute infarct. Normal parenchymal attenuation. Normal ventricles and sulci.     VISUALIZED ORBITS/SINUSES/MASTOIDS: No intraorbital abnormality. No paranasal sinus mucosal disease. No middle ear or mastoid effusion.     BONES/SOFT TISSUES: Minimal soft tissue swelling involving the right parietal scalp. No calvarial fracture.     IMPRESSION:   1.  Minimal right parietal scalp soft tissue swelling.   2.  No acute intracranial process.      Exam: Duplex Doppler ultrasound of the kidneys and bilateral renal arteries dated 9/2/2021 8:18 AM.     Comparison Study: None     Clinical Information: Coronary artery disease and hypertension.     Technique: B-mode (grayscale) and duplex Doppler evaluation of the  abdominal aorta and renal arteries performed. Velocity measurements  obtained with angle correction at or less than 60 degrees.     Ordering provider: EFREN LEE     Findings:     The right kidney measures 11.2 cm in length. The left kidney measures  11.8 cm in length. Cortical thickness and echogenicity is normal. No  evidence of stones, masses or hydronephrosis.     Peak systolic velocities in the abdominal aorta are:      126/0 cm/sec in the suprarenal aorta, triphasic   115/0 cm/sec in the infrarenal proximal aorta, triphasic   113/0 cm/sec in the infrarenal distal aorta, triphasic     Right renal artery velocities:     Origin: 127/32 cm/sec   Proximal renal artery: 132/39 cm/sec   Mid renal artery: 68/29   Hilum/distal renal artery: 58/18 cm/sec     Resistive indices in the arcuate arteries:      Inferior pole: 0.6  Mid pole: 0.6  Superior pole: 0.5     Renal artery to aorta ratio: 1.0     The right renal vein is patent and demonstrates phasicity.     Left renal artery velocities:     Origin: 93/26 cm/sec   Proximal renal artery: 56/17 cm/sec   Mid renal artery: 112/22    Hilum/distal renal artery: 54/16 cm/sec     Resistive indices in the arcuate arteries:      Inferior pole: 0.6  Mid pole: 0.6  Superior pole: 0.6     Renal artery to aorta ratio: 0.9     Cardiac arrhythmia consistent with history of type II Wenckebach AV  block.                                                                      Impression:     1. Right kidney:     1a. Renal parenchyma: Normal.  1b. Renal artery: Patent with no evidence for stenosis.     2. Left kidney:     2a. Renal parenchyma: Normal.  2b. Renal artery: Patent with no evidence for stenosis.     [place]  08-30-21    Sin rhythm, avg HR 65 bpm, max , min HR 39, 6 periods SVT, idioventricular ryhtm , isolated SVES 5.3 %, < 1 % SVES, rare VES, couplets and triplets.         Assessment and Plan:     We discussed the results with patient.  We discussed the importance of a heart healthy diet and lifestyle.  We also discussed the importance of no drinks - because this can induce some arrhythmias.  We disucssed following changes in medicla therapy.  Start amlodipine 5mg at bedtime.  Start losartan 100mg once daily  Start aspirin 81mg once daily     Follow-up:  Follow up with Dr Smallwood in 3-4 months with labs prior.    Jerry Smallwood MD, PhD  Professor of Medicine  Division of Cardiology          CC  Patient Care Team:  Hay Melendez as PCP - General (Internal Medicine)  Jerry Smallwood MD as MD (Cardiovascular Disease)  SELF, REFERRED

## 2021-09-29 ENCOUNTER — CARE COORDINATION (OUTPATIENT)
Dept: CARDIOLOGY | Facility: CLINIC | Age: 61
End: 2021-09-29
Payer: COMMERCIAL

## 2021-09-30 ENCOUNTER — CARE COORDINATION (OUTPATIENT)
Dept: CARDIOLOGY | Facility: CLINIC | Age: 61
End: 2021-09-30

## 2021-09-30 DIAGNOSIS — E78.5 HYPERLIPIDEMIA LDL GOAL <100: Primary | ICD-10-CM

## 2021-09-30 RX ORDER — EZETIMIBE 10 MG/1
10 TABLET ORAL DAILY
Qty: 90 TABLET | Refills: 3 | Status: SHIPPED | OUTPATIENT
Start: 2021-09-30 | End: 2022-01-27

## 2021-09-30 NOTE — PROGRESS NOTES
Patient/daughter has been communicating directly with Dr. Smallwood.  Per his recommendation, we will    1.   Continue Atorvastatin 40 mg daily  2.   Add Zetia 10 mg daily  3.   Order/Schedule CT Coronary Calcium Scan (in preparation to obtain prior authorization for praluent or repatha if needed)  4.   Repeat fasting lipids in 2-3 month    I will notify patient and/or daughter of the plan.

## 2021-10-03 ENCOUNTER — HEALTH MAINTENANCE LETTER (OUTPATIENT)
Age: 61
End: 2021-10-03

## 2021-10-06 ENCOUNTER — HOSPITAL ENCOUNTER (OUTPATIENT)
Dept: CT IMAGING | Facility: CLINIC | Age: 61
Discharge: HOME | End: 2021-10-06
Attending: INTERNAL MEDICINE | Admitting: INTERNAL MEDICINE
Payer: COMMERCIAL

## 2021-10-06 DIAGNOSIS — E78.5 HYPERLIPIDEMIA LDL GOAL <100: ICD-10-CM

## 2021-10-06 PROCEDURE — 75571 CT HRT W/O DYE W/CA TEST: CPT | Mod: 26 | Performed by: STUDENT IN AN ORGANIZED HEALTH CARE EDUCATION/TRAINING PROGRAM

## 2021-10-06 PROCEDURE — 75571 CT HRT W/O DYE W/CA TEST: CPT

## 2021-10-14 ENCOUNTER — CARE COORDINATION (OUTPATIENT)
Dept: CARDIOLOGY | Facility: CLINIC | Age: 61
End: 2021-10-14
Payer: COMMERCIAL

## 2021-10-14 NOTE — PROGRESS NOTES
My Chart message below sent to patient today.  Per Dr. Smallwood staff message, plan to continue atorvastatin 80 and zetia 10 daily.  If side effects occur, consider PCSK9 inhibitor.      Yasir,     Dr Smallwood has reviewed your calcium scan which showed a calcium score of 319.  This puts you in an 83 percentile for risk to develop coronary artery disease.  Therefore, we need to treat risk factors such as cholesterol.    Dr. Smallwood recommends you continue the atorvastatin 80 mg and zetia 10 mg daily. He would like you to have another fasting cholesterol panel in 2-3 months.  The goal is to keep your LDL (bad cholesterol) < 70.    We will put the orders in your chart for the labs. You may get the lab drawn at any [place] clinic but make sure you call them to see if you need to make an appointment.    Let us know if you are experiencing any side effects from the medication.    Thank you!  Allison AVILA RN

## 2021-11-16 ENCOUNTER — TELEPHONE (OUTPATIENT)
Dept: PALLIATIVE MEDICINE | Facility: CLINIC | Age: 61
End: 2021-11-16

## 2021-11-16 DIAGNOSIS — M54.16 LUMBAR RADICULOPATHY: Primary | ICD-10-CM

## 2021-11-16 NOTE — TELEPHONE ENCOUNTER
Reason for call:  Order   Order or referral being requested: Repeat Lumbar L4-5 interlaminar epidural steroid injection  Reason for request: n/a  Date needed: as soon as possible  Has the patient been seen by the PCP for this problem? YES    Additional comments: none    Phone number to reach patient:  Home number on file [phone] (home)    Best Time:  anytime    Can we leave a detailed message on this number?  YES    Travel screening: Not Applicable     Ilda CASTAÑEDA    [facility]

## 2021-11-16 NOTE — TELEPHONE ENCOUNTER
Called pt. Confirmed that he would like repeat injection. Reports previous injection was very helpful for 2 months, relief diminishing and now pain returned which he thinks is in part due to mattress from [place] trip. No new symptoms. Routing to provider to review for repeat injection.    Date of Visit: 8/19/2021     Procedure performed: Lumbar L4-5 interlaminar epidural steroid injection  Diagnosis: Lumbar spondylosis; Lumbar radiculitis/radiculopathy        Aruna QUEVEDO, RN Care Coordinator  [facility]  [facility]

## 2021-11-17 ENCOUNTER — TELEPHONE (OUTPATIENT)
Dept: PALLIATIVE MEDICINE | Facility: CLINIC | Age: 61
End: 2021-11-17
Payer: COMMERCIAL

## 2021-11-17 DIAGNOSIS — M54.16 LUMBAR RADICULOPATHY: Primary | ICD-10-CM

## 2021-11-17 RX ORDER — CYCLOBENZAPRINE HCL 5 MG
5-10 TABLET ORAL 3 TIMES DAILY PRN
Qty: 90 TABLET | Refills: 1 | Status: SHIPPED | OUTPATIENT
Start: 2021-11-17 | End: 2022-06-20

## 2021-11-17 RX ORDER — MELOXICAM 7.5 MG/1
15 TABLET ORAL DAILY
Qty: 60 TABLET | Refills: 1 | Status: SHIPPED | OUTPATIENT
Start: 2021-11-17 | End: 2022-06-20

## 2021-11-17 NOTE — TELEPHONE ENCOUNTER
Screening Questions for Radiology Injections:    Injection to be done at which interventional clinic site? [facility]    If [facility] location, tell patient that this procedure requires a COVID-19 lab test be done within 4 days of the procedure. Would you still like to move forward with scheduling the procedure?  Not Applicable   If YES, let patient know that someone will call them to schedule the COVID-19 test and that they will only receive a call back if the result is positive. Route to nursing to enter order.     Instruct patient to arrive as directed prior to the scheduled appointment time:    [place] minutes before      [place]: 30 minutes before; if IV needed 1 hour before     Procedure ordered by Chris    Procedure ordered? L4-5 IL-DANII       Transforaminal Cervical DANII -  [place] does NOT have providers that do these- [facility] [place] [facility] do have providers that do    As a reminder, receiving steroids can decrease your body's ability to fight infection.   Would you still like to move forward with scheduling the injection?  Yes    What insurance would patient like us to bill for this procedure? [facility]      Worker's comp or MVA (motor vehicle accident) -Any injection DO NOT SCHEDULE and route to Alka Morgan.      [employer] insurance - For SI joint injections, DO NOT SCHEDULE and route Alka Morgan.       ALL BCBS, [employer] and [employer]-Route to Alka for review DO NOT SCHEDULE      IF SCHEDULING IN [place] AND NEEDS A PA, IT IS OKAY TO SCHEDULE. [place] HANDLES THEIR OWN PA'S AFTER THE PATIENT IS SCHEDULED. PLEASE SCHEDULE AT LEAST 1 WEEK OUT SO A PA CAN BE OBTAINED.    Any chance of pregnancy? NO   If YES, do NOT schedule and route to RN pool    Is an  needed? No     Patient has a drive home? (mandatory) YES: ok    Is patient taking any blood thinners (That is: Coumadin, Warfarin, Jantoven, Pradaxa Xarelto, Eliquis, Edoxaban, Enoxaparin, Lovenox, Heparin, Arixtra,  Fondaparinux, or Fragmin? OR Antiplatelet medication such as Plavix, Brilinta, or Effient? )? No   If hold needed, do NOT schedule, route to RN pool     Is patient taking any aspirin products (includes Excedrin and Fiorinal)? Yes - Pt takes 81mg daily; instructed to hold 0 day(s) prior to procedure.      If more than 325mg/day, OK to schedule; Instruct pt to decrease to less than 325 mg for 7 days AND route to RN pool    For CERVICAL procedures, hold all aspirin products for 6 days.     Tell pt that if aspirin product is not held for 6 days, the procedure WILL BE cancelled.      Does the patient have a bleeding or clotting disorder? No     If YES, okay to schedule AND route to RN nurse pool    For any patients with platelet count <100, must be forwarded to provider    Any allergies to contrast dye, iodine, shellfish, or numbing and steroid medications? No    If YES, add allergy information to appointment notes AND route to the RN pool     If DANII and Contrast Dye / Iodine Allergy? DO NOT SCHEDULE, route to RN pool    Allergies: Patient has no known allergies.     Is patient diabetic?  No  If YES, instruct them to bring their glucometer.    Does patient have an active infection or treated for one within the past week? No     Is patient currently taking any antibiotics?  No     For patients on chronic, preventative, or prophylactic antibiotics, procedures may be scheduled.     For patients on antibiotics for active or recent infection:antibiotic course must have been completed for 4 days    Is patient currently taking any steroid medications? (i.e. Prednisone, Medrol)  No     For patients on steroid medications, course must have been completed for 4 days    Is patient actively being treated for cancer or immunocompromised? No  If YES, do NOT schedule and route to RN pool     Are you able to get on and off an exam table with minimal or no assistance? Yes  If NO, do NOT schedule and route to RN pool    Are you able to  roll over and lay on your stomach with minimal or no assistance? Yes  If NO, do NOT schedule and route to RN pool     Has the patient had a flu shot or any other vaccinations within 7 days before or after the procedure.  No     Have you recently had a COVID vaccine or have plans to get it in the near future? No    If yes, explain that for the vaccine to work best they need to:       wait 1 week before and 1 week after getting Vaccine #1    wait 1 week before and 2 weeks after getting Vaccine #2    wait 1 week before and 2 weeks after getting Vaccine BOOSTER    If patient has concerns about the timing, send to RN pool     Does patient have an MRI/CT?  YES: MRI  Check Procedure Scheduling Grid to see if required.      Was the MRI done within the last 3 years?  Yes    If yes, where was the MRI done i.e.[facility], [facility], [facility], [facility] etc? [facility]      If no, do not schedule and route to RN pool    If MRI was not done at [place], [facility] or [facility] do NOT schedule and route to RN pool.      If pt has an imaging disc, the injection MAY be scheduled but pt has to bring disc to appt.     If they show up without the disc the injection cannot be done    Procedure Specific Instructions:      If celiac plexus block, informed patient NPO for 6 hours and that it is okay to take medications with sips of water, especially blood pressure medications  Not Applicable         If this is for a cervical procedure, informed patient that aspirin needs to be held for 6 days.   Not Applicable      If IV needed:    Do not schedule procedures requiring IV placement in the first appointment of the day or first appointment after lunch. Do NOT schedule at 0745, 0815 or 1245. ok    Instructed pt to arrive 30 minutes early for IV start if required. (Check Procedure Scheduling Grid)  Not Applicable    Reminders:      If you are started on any steroids or antibiotics between now and your appointment, you must contact us  because the procedure may need to be cancelled.  Yes      For all procedures except radiofrequency ablations (RFAs) and spinal cord stimulator (SCS) trials, informed patient:    IV sedation is not provided for this procedure.  If you feel that an oral anti-anxiety medication is needed, you can discuss this further with your referring provider or primary care provider.  The Pain Clinic provider will discuss specifics of what the procedure includes at your appointment.  Most procedures last 10-20 minutes.  We use numbing medications to help with any discomfort during the procedure.  Not Applicable      For patients 85 or older we recommend having an adult stay w/ them for the remainder of the day.   ok    Does the patient have any questions?  NO  Grace Spivey  [facility]

## 2021-11-19 ENCOUNTER — TELEPHONE (OUTPATIENT)
Dept: ANESTHESIOLOGY | Facility: CLINIC | Age: 61
End: 2021-11-19
Payer: COMMERCIAL

## 2021-11-19 DIAGNOSIS — M54.16 LUMBAR RADICULOPATHY: Primary | ICD-10-CM

## 2021-11-19 DIAGNOSIS — Z11.59 ENCOUNTER FOR SCREENING FOR OTHER VIRAL DISEASES: ICD-10-CM

## 2021-11-19 NOTE — TELEPHONE ENCOUNTER
LPN called and read through the instructions with pt for the recommended procedure: Lumbar Epidural Steroid Injection  Pt verbalized understanding to holding appropriate medication per protocol, and was agreeable to NPO policy and needing a .    Anticoagulant: Asa 81 mg, Pt is OKAY to continue.     Recommended follow up: LPN will clarify with Dr. Rucker.     Maryam Richards LPN

## 2021-11-20 ENCOUNTER — LAB (OUTPATIENT)
Dept: LAB | Facility: CLINIC | Age: 61
End: 2021-11-20

## 2021-11-20 DIAGNOSIS — Z11.59 ENCOUNTER FOR SCREENING FOR OTHER VIRAL DISEASES: ICD-10-CM

## 2021-11-20 PROCEDURE — U0003 INFECTIOUS AGENT DETECTION BY NUCLEIC ACID (DNA OR RNA); SEVERE ACUTE RESPIRATORY SYNDROME CORONAVIRUS 2 (SARS-COV-2) (CORONAVIRUS DISEASE [COVID-19]), AMPLIFIED PROBE TECHNIQUE, MAKING USE OF HIGH THROUGHPUT TECHNOLOGIES AS DESCRIBED BY CMS-2020-01-R: HCPCS

## 2021-11-21 LAB — SARS-COV-2 RNA RESP QL NAA+PROBE: NEGATIVE

## 2021-11-22 NOTE — TELEPHONE ENCOUNTER
Scheduled 12/02/21 per chart review    Aruna QUEVEDO, RN Care Coordinator  [facility]  [facility]

## 2021-11-23 ENCOUNTER — ANCILLARY PROCEDURE (OUTPATIENT)
Dept: RADIOLOGY | Facility: AMBULATORY SURGERY CENTER | Age: 61
End: 2021-11-23
Payer: COMMERCIAL

## 2021-11-23 ENCOUNTER — HOSPITAL ENCOUNTER (OUTPATIENT)
Facility: AMBULATORY SURGERY CENTER | Age: 61
Discharge: HOME | End: 2021-11-23
Payer: COMMERCIAL

## 2021-11-23 VITALS
HEIGHT: 71 IN | BODY MASS INDEX: 29.4 KG/M2 | OXYGEN SATURATION: 99 % | WEIGHT: 210 LBS | TEMPERATURE: 98.1 F | DIASTOLIC BLOOD PRESSURE: 88 MMHG | RESPIRATION RATE: 16 BRPM | SYSTOLIC BLOOD PRESSURE: 167 MMHG | HEART RATE: 55 BPM

## 2021-11-23 DIAGNOSIS — R52 PAIN: ICD-10-CM

## 2021-11-23 PROCEDURE — 62323 NJX INTERLAMINAR LMBR/SAC: CPT

## 2021-11-23 RX ORDER — LIDOCAINE HYDROCHLORIDE 10 MG/ML
INJECTION, SOLUTION EPIDURAL; INFILTRATION; INTRACAUDAL; PERINEURAL PRN
Status: DISCONTINUED | OUTPATIENT
Start: 2021-11-23 | End: 2021-11-23 | Stop reason: HOSPADM

## 2021-11-23 RX ORDER — METHYLPREDNISOLONE ACETATE 40 MG/ML
INJECTION, SUSPENSION INTRA-ARTICULAR; INTRALESIONAL; INTRAMUSCULAR; SOFT TISSUE PRN
Status: DISCONTINUED | OUTPATIENT
Start: 2021-11-23 | End: 2021-11-23 | Stop reason: HOSPADM

## 2021-11-23 RX ORDER — BUPIVACAINE HYDROCHLORIDE 2.5 MG/ML
INJECTION, SOLUTION EPIDURAL; INFILTRATION; INTRACAUDAL PRN
Status: DISCONTINUED | OUTPATIENT
Start: 2021-11-23 | End: 2021-11-23 | Stop reason: HOSPADM

## 2021-11-23 RX ORDER — IOPAMIDOL 408 MG/ML
INJECTION, SOLUTION INTRATHECAL PRN
Status: DISCONTINUED | OUTPATIENT
Start: 2021-11-23 | End: 2021-11-23 | Stop reason: HOSPADM

## 2021-11-23 ASSESSMENT — MIFFLIN-ST. JEOR: SCORE: 1779.68

## 2021-11-23 NOTE — DISCHARGE INSTRUCTIONS
"Home Care Instructions after an Epidural Steroid Pain Injection    A lumbar epidural steroid injection delivers steroid medication directly into the area that may be causing your lower back pain and/or leg pain. A cervical or thoracic epidural steroid injection delivers steroids into the epidural space surrounding spinal nerve roots to help relieve pain in the upper spine/neck.    Activity  -Rest today  -Do not work today  -Resume normal activity tomorrow  -DO NOT shower for 24 hours  -DO NOT remove bandaid for 24 hours    Pain  -You may experience soreness at the injection site for one or two days  -You may use an ice pack for 20 minutes every 2 hours for the first 24 hours  -You may use a heating pad after the first 24 hours  -You may use Tylenol (acetaminophen) every 4 hours or other pain medicines as     directed by your physician    You may experience numbness radiating into your legs or arms (depending on the procedure location). This numbness may last several hours. Until sensation returns to normal; please use caution in walking, climbing stairs, and stepping out of your vehicle, etc.    DID YOU RECEIVE SEDATION TODAY?  {YES / NO:[id number]::\"Yes\"}    Safety  Sedation medicine, if given, may remain active for many hours. It is important for the next 24 hours that you do not:  -Drive a car  -Operate machines or power tools  -Consume alcohol, including beer  -Sign any important papers or legal documents      Common side effects of steroids:  Not everyone will experience corticosteroid side effects. If side effects are experienced, they will gradually subside in the 7-10 day period following an injection. Most common side effects include:  -Flushed face and/or chest  -Feeling of warmth, particularly in the face but could be an overall feeling of warmth  -Increased blood sugar in diabetic patients  -Menstrual irregularities my occur. If taking hormone-based birth control an alternate method of birth control is " recommended  -Sleep disturbances and/or mood swings are possible  -Leg cramps    Please contact us if you have:  -Severe pain  -Fever more than 101.5 degrees Fahrenheit  -Signs of infection at the injection site (redness, swelling, or drainage)    If you have questions, please contact our office at [phone] between the hours of 7:00 am and 3:00 pm Monday through Friday. After office hours you can contact the on call provider by dialing [phone]. If you need immediate attention, we recommend that you go to a hospital emergency room or dial [phone].

## 2021-11-28 DIAGNOSIS — M54.16 LUMBAR RADICULOPATHY: Primary | ICD-10-CM

## 2021-11-29 DIAGNOSIS — M53.3 PAIN OF RIGHT SACROILIAC JOINT: Primary | ICD-10-CM

## 2021-11-29 NOTE — PROCEDURES
Interlaminar Epidural Steroid Injection  The patient s identity, the procedure to be performed and the specific site of the procedure was verified in accordance with [facility] Protocol.    Diagnosis: Lumbar Radiculopathy  Pre-procedure Pain Score: 9/10    Procedure Note:    Informed consent was obtained.  The patient was placed comfortably into a prone position and was then prepped and draped in a sterile fashion.  There was no evidence of infection at the site of needle insertion.  The skin was anesthetized with 1% lidocaine and a tuohy needle was advanced under fluoroscopic guidance into the epidural space using a loss of resistance technique.  CSF was not aspirated, blood was not aspirated, paresthesia was not noted.  Position was confirmed with radio-opaque contrast.  The patient tolerated the procedure without complications.  The patient was observed for 30 minutes and was then released with post-procedure instructions.    The patient was given discharge instructions and verbalizes understanding, including understanding of those signs and symptoms that would require emergency care.     Level:L4/5  Laterality: central     Needle Type:   20g touh        Medication:  40mg depomedrol  3ml Normal Saline,   1ml 0.25% Bupivacaine        Post Procedure Pain Score: 3/10      Counseling: Greater than 50% of this patient visit was spent in counseling the patient regarding the treatment of their pain, coordinating their overall treatment plan and assessing their progress.

## 2021-11-30 ENCOUNTER — ANCILLARY PROCEDURE (OUTPATIENT)
Dept: MRI IMAGING | Facility: CLINIC | Age: 61
End: 2021-11-30
Attending: ANESTHESIOLOGY
Payer: COMMERCIAL

## 2021-11-30 DIAGNOSIS — M54.16 LUMBAR RADICULOPATHY: ICD-10-CM

## 2021-11-30 DIAGNOSIS — M53.3 PAIN OF RIGHT SACROILIAC JOINT: ICD-10-CM

## 2021-11-30 PROCEDURE — 72195 MRI PELVIS W/O DYE: CPT | Performed by: RADIOLOGY

## 2021-11-30 PROCEDURE — 72148 MRI LUMBAR SPINE W/O DYE: CPT | Performed by: RADIOLOGY

## 2021-11-30 NOTE — PROGRESS NOTES
[facility] - Procedure Note    Date of Service: 12/02/2021  Procedure performed: LEFT sacroiliac joint injection and Left L5-S1 facet joint injection  Diagnosis: Sacroiliac joint pain  : Jill Perez MD  Anesthesia: none    Indications: Yasir Hines is a 61 year old male who is seen at the request of myself for a sacroiliac joint injection. The patient describes left sided low back and buttock pain with radiation into the posterior thigh on that side. Exam shows full strength and sensation in both lower extremities, tenderness of the sacroiliac (SI) joint, and positive FABERE on the LEFT. The patient reports minimal improvement with conservative treatment, including previous injections, HEP and medications.    S/P repeat L4-5 ILESI by Dr. Rucker on 11/23/2021.     Imaging:  MRI LUMBAR  11/20/2021:                                                            Impression: Multilevel lumbar spondylosis as detailed above. Compared  to 6/21/2021, there is a new left central/subarticular extrusion at  L2-3 effacing the lateral recess and compressing on the cauda equina  nerve roots. There is also associated moderate spinal canal stenosis  at that level. Stable right subarticular extrusion at L5-S1 mid  compression of the descending right S1 nerve root. Findings at other  levels are stable.    MRI SACRUM & Coccyx 11/30/2021                                                                  IMPRESSION:  1. No MRI findings to suggest sacroiliitis.  2. Degenerative disc disease at L4-L5 and L5-S1, refer to the MRI of  6/21/2021 for more complete evaluation.  3. No marrow signal abnormalities to suggest fracture or marrow  infiltration.  4. Enlarged prostate gland, correlate clinically.       Allergies:    No Known Allergies     Vitals:  BP (!) 142/84   Pulse 64   SpO2 98%     Options/alternatives, benefits and risks were discussed with the patient including bleeding, infection, tissue  trauma, exposure to radiation, reaction to medications including seizure, nerve injury, weakness, and numbness.  Questions were answered to his satisfaction and he agrees to proceed. Voluntary informed consent was obtained and signed.     Procedure:  After getting informed consent, patient was brought into the procedure suite and was placed in a prone position on the procedure table.   A Pause for the Cause was performed.  Patient was prepped and draped in sterile fashion.     SI joint injection:   After identifying the right SI joint, the C-arm was rotated to a obliquely to obtain the best view of the inferior angle of the joint.  A total of 2 ml of Lidocaine 1%  was used to anesthetize the skin at a skin entry site coaxial with the fluoroscopy beam at this location.  A 22 gauge 3.5 inch needle was advanced under intermittent fluoroscopy until it was felt to enter the SI joint.    A total of 1 ml of Omnipaque-300 was injected, confirming appropriate position, with spread into the intraarticular space, with no intravascular uptake noted.  9 ml of Omnipaque-300 was wasted. Location was verified in lateral view.    2 ml of 0.5% bupivacaine with 40 mg of kenalog was injected.  The needle was removed. Hemostasis was achieved, the area was cleaned, and bandaids were placed when appropriate.    FACET JOINT INJECTION:   Under AP fluoroscopic guidance the L5-S1 facet joint on the LEFT side were identified, and the C-arm was rotated obliquely to the affected side to open the joint space. A total of 4 ml of 1% lidocaine was injected at the needle entry point and needle tract. Then a 22 gauge 3.5 inch quincke type spinal needle was inserted and advanced under fluoroscopic guidance targeting the superior articular pillar of each joint. Once the needle made a contact with SAP, it was rotated and was then advanced into the joint.    AP fluoroscopic views were obtained to confirm the needle placement. Then,  Omnipaque 300 contrast  dye was injected after negative aspiration for heme and CSF in each joint, confirming appropriate placement.  A total of 1mL of Omnipaque was used and 9mL was wasted.    The injection was then accomplished using a solution containing 1ml of 1% Lidocaine mixed with 40mg of kenolog. The needles were removed..    The patient tolerated the procedure well, and was taken to the recovery room.    Images were saved to PACS.      Pre-procedure pain score: 8/10  Post-procedure pain score: 1/10  Following today's procedure, the patient was advised to contact the Pain Management Center for any of the following:   Fever, chills, or night sweats   New onset of pain, numbness, or weakness   Any questions/concerns regarding the procedure    -f/u with the referring provider      ANGELA BONILLA MD   Pain Management & Addiction Medicine

## 2021-12-02 ENCOUNTER — RADIOLOGY INJECTION OFFICE VISIT (OUTPATIENT)
Dept: PALLIATIVE MEDICINE | Facility: CLINIC | Age: 61
End: 2021-12-02
Attending: ANESTHESIOLOGY
Payer: COMMERCIAL

## 2021-12-02 VITALS — HEART RATE: 65 BPM | DIASTOLIC BLOOD PRESSURE: 94 MMHG | OXYGEN SATURATION: 100 % | SYSTOLIC BLOOD PRESSURE: 141 MMHG

## 2021-12-02 DIAGNOSIS — M46.1 SACROILIITIS (H): Primary | ICD-10-CM

## 2021-12-02 DIAGNOSIS — M54.16 LUMBAR RADICULOPATHY: ICD-10-CM

## 2021-12-02 DIAGNOSIS — M47.897 OTHER SPONDYLOSIS, LUMBOSACRAL REGION: ICD-10-CM

## 2021-12-02 PROCEDURE — 27096 INJECT SACROILIAC JOINT: CPT | Mod: LT | Performed by: ANESTHESIOLOGY

## 2021-12-02 PROCEDURE — 64493 INJ PARAVERT F JNT L/S 1 LEV: CPT | Mod: LT | Performed by: ANESTHESIOLOGY

## 2021-12-02 RX ORDER — TRIAMCINOLONE ACETONIDE 40 MG/ML
40 INJECTION, SUSPENSION INTRA-ARTICULAR; INTRAMUSCULAR ONCE
Status: COMPLETED | OUTPATIENT
Start: 2021-12-02 | End: 2021-12-02

## 2021-12-02 RX ADMIN — TRIAMCINOLONE ACETONIDE 40 MG: 40 INJECTION, SUSPENSION INTRA-ARTICULAR; INTRAMUSCULAR at 14:56

## 2021-12-02 RX ADMIN — TRIAMCINOLONE ACETONIDE 40 MG: 40 INJECTION, SUSPENSION INTRA-ARTICULAR; INTRAMUSCULAR at 14:55

## 2021-12-02 NOTE — NURSING NOTE
Pre-procedure Intake  If YES to any questions or NO to having a   Please complete laminated checklist and leave on the computer keyboard for Provider, verbally inform provider if able.    For SCS Trial, RFA's or any sedation procedure:  Have you been fasting? NA    If yes, for how long?     Are you taking any any blood thinners such as Coumadin, Warfarin, Jantoven, Pradaxa Xarelto, Eliquis, Edoxaban, Enoxaparin, Lovenox, Heparin, Arixtra, Fondaparinux, or Fragmin? OR Antiplatelet medication such as Plavix, Brilinta, or Effient?   No     If yes, when did you take your last dose?     Do you take aspirin?  Yes -   ASA    If cervical procedure, have you held aspirin for 6 days?   NA    Do you have any allergies to contrast dye, iodine, steroid and/or numbing medications?  NO    Are you currently taking antibiotics or have an active infection?  NO    Have you had a fever/elevated temperature within the past week? NO    Are you currently taking oral steroids? NO    Do you have a ? Yes    Are you pregnant or breastfeeding?  NO    Have you received the COVID-19 vaccine? Yes    If yes, was it your 1st, 2nd or only dose needed?     Date of most recent vaccine: Month ago    Notify provider and RNs if systolic BP >170, diastolic BP >100, P >100 or O2 sats < 90%

## 2021-12-02 NOTE — NURSING NOTE
Discharge Information    IV Discontiued Time:  NA    Amount of Fluid Infused:  NA    Discharge Criteria = When patient returns to baseline or as per MD order    Consciousness:  Pt is fully awake    Circulation:  BP +/- 20% of pre-procedure level    Respiration:  Patient is able to breathe deeply    O2 Sat:  Patient is able to maintain O2 Sat >92% on room air    Activity:  Moves 4 extremities on command    Ambulation:  Patient is able to stand and walk or stand and pivot into wheelchair    Dressing:  Clean/dry or No Dressing    Notes:   Discharge instructions and AVS given to patient    Patient meets criteria for discharge?  YES    Admitted to PCU?  No    Responsible adult present to accompany patient home?  Yes    Signature/Title:    Aruna Jj RN  RN Care Coordinator  [facility] [place]

## 2021-12-02 NOTE — PATIENT INSTRUCTIONS
[facility] Procedure Discharge Instructions    Today you saw:   Dr. Jill Perez    Your procedure:  Epidural steroid injection        Medications used:  Lidocaine (anesthetic)   Kenalog (steroid)  Omnipaque (contrast)              Be cautious when walking as numbness and/or weakness in the legs may occur up to 6-8 hours after the procedure due to effect of the local anesthetic    Do not drive for 6 hours. The effect of the local anesthetic could slow your reflexes.     Avoid strenuous activity for the first 24 hours. You may resume your regular activities after that.     You may shower, however avoid swimming, tub baths or hot tubs for 24 hours following your procedure    You may have a mild to moderate increase in pain for several days following the injection.      You may use ice packs for 10-15 minutes, 3 to 4 times a day at the injection site for comfort    Do not use heat to painful areas for 6 to 8 hours. This will give the local anesthetic time to wear off and prevent you from accidentally burning your skin.    Unless you have been directed to avoid the use of anti-inflammatory medications (NSAIDS-ibuprofen, Aleve, Motrin), you may use these medications or Tylenol for pain control if needed.     With diabetes, check your blood sugar more frequently than usual as your blood sugar may be higher than normal for 10-14 days following a steroid injection. Contact your doctor who manages your diabetes if your blood sugar is higher than usual    Possible side effects of steroids that you may experience include flushing, elevated blood pressure, increased appetite, mild headaches and restlessness.  All of these symptoms will get better with time.    It may take up to 14 days for the steroid medication to start working although you may feel the effect as early as a few days after the procedure.     Follow up with your referring provider in 2-3 weeks      If you experience any of the following, call  the pain center line during work hours at [phone] or on-call physician after hours at [phone]:  -Fever over 100 degree F  -Swelling, bleeding, redness, drainage, warmth at the injection site  -Progressive weakness or numbness in your legs   -Loss of bowel or bladder function  -Unusual headache that is not relieved by Tylenol or your regular headache medication  -Unusual new onset of pain that is not improving

## 2021-12-08 DIAGNOSIS — M54.16 LUMBAR RADICULOPATHY: Primary | ICD-10-CM

## 2021-12-08 RX ORDER — GABAPENTIN 300 MG/1
300 CAPSULE ORAL 3 TIMES DAILY
Qty: 90 CAPSULE | Refills: 1 | Status: SHIPPED | OUTPATIENT
Start: 2021-12-08 | End: 2022-06-20

## 2021-12-13 DIAGNOSIS — I10 ESSENTIAL HYPERTENSION: ICD-10-CM

## 2021-12-13 DIAGNOSIS — R55 SYNCOPE, UNSPECIFIED SYNCOPE TYPE: ICD-10-CM

## 2021-12-15 RX ORDER — AMLODIPINE BESYLATE 5 MG/1
5 TABLET ORAL DAILY
Qty: 90 TABLET | Refills: 0 | Status: SHIPPED | OUTPATIENT
Start: 2021-12-15 | End: 2022-01-27

## 2021-12-15 NOTE — TELEPHONE ENCOUNTER
amLODIPine (NORVASC) 5 MG tablet  Last Written Prescription Date: 8/30/21  Last Fill Quantity: 90,   # refills: 1  Last Office Visit : 8/30/21  Future Office visit:  1/27/22 7/23/21    CREATININE 0.72 - 1.25 mg/dL 1.21          Routing refill request to provider for review/approval because:bp> 140/90

## 2022-01-03 DIAGNOSIS — R91.8 PULMONARY NODULES: Primary | ICD-10-CM

## 2022-01-24 ENCOUNTER — LAB (OUTPATIENT)
Dept: LAB | Facility: CLINIC | Age: 62
End: 2022-01-24
Attending: INTERNAL MEDICINE
Payer: COMMERCIAL

## 2022-01-24 DIAGNOSIS — R55 SYNCOPE, UNSPECIFIED SYNCOPE TYPE: ICD-10-CM

## 2022-01-24 DIAGNOSIS — I10 ESSENTIAL HYPERTENSION: ICD-10-CM

## 2022-01-24 LAB
ALBUMIN SERPL-MCNC: 3.8 G/DL (ref 3.4–5)
ALP SERPL-CCNC: 55 U/L (ref 40–150)
ALT SERPL W P-5'-P-CCNC: 50 U/L (ref 0–70)
ANION GAP SERPL CALCULATED.3IONS-SCNC: 10 MMOL/L (ref 3–14)
AST SERPL W P-5'-P-CCNC: 26 U/L (ref 0–45)
BILIRUB SERPL-MCNC: 0.6 MG/DL (ref 0.2–1.3)
BUN SERPL-MCNC: 20 MG/DL (ref 7–30)
CALCIUM SERPL-MCNC: 9.4 MG/DL (ref 8.5–10.1)
CHLORIDE BLD-SCNC: 104 MMOL/L (ref 94–109)
CHOLEST SERPL-MCNC: 145 MG/DL
CO2 SERPL-SCNC: 27 MMOL/L (ref 20–32)
CREAT SERPL-MCNC: 1.1 MG/DL (ref 0.66–1.25)
FASTING STATUS PATIENT QL REPORTED: YES
GFR SERPL CREATININE-BSD FRML MDRD: 76 ML/MIN/1.73M2
GLUCOSE BLD-MCNC: 93 MG/DL (ref 70–99)
HDLC SERPL-MCNC: 56 MG/DL
LDLC SERPL CALC-MCNC: 79 MG/DL
NONHDLC SERPL-MCNC: 89 MG/DL
POTASSIUM BLD-SCNC: 4.1 MMOL/L (ref 3.4–5.3)
PROT SERPL-MCNC: 7.7 G/DL (ref 6.8–8.8)
SODIUM SERPL-SCNC: 141 MMOL/L (ref 133–144)
TRIGL SERPL-MCNC: 52 MG/DL

## 2022-01-24 PROCEDURE — 36415 COLL VENOUS BLD VENIPUNCTURE: CPT | Performed by: PATHOLOGY

## 2022-01-24 PROCEDURE — 80061 LIPID PANEL: CPT | Performed by: PATHOLOGY

## 2022-01-24 PROCEDURE — 80053 COMPREHEN METABOLIC PANEL: CPT | Performed by: PATHOLOGY

## 2022-01-27 ENCOUNTER — OFFICE VISIT (OUTPATIENT)
Dept: CARDIOLOGY | Facility: CLINIC | Age: 62
End: 2022-01-27
Attending: INTERNAL MEDICINE
Payer: COMMERCIAL

## 2022-01-27 VITALS
BODY MASS INDEX: 28.98 KG/M2 | HEART RATE: 74 BPM | WEIGHT: 207 LBS | SYSTOLIC BLOOD PRESSURE: 160 MMHG | OXYGEN SATURATION: 96 % | HEIGHT: 71 IN | DIASTOLIC BLOOD PRESSURE: 76 MMHG

## 2022-01-27 DIAGNOSIS — R55 SYNCOPE, UNSPECIFIED SYNCOPE TYPE: ICD-10-CM

## 2022-01-27 DIAGNOSIS — E78.5 HYPERLIPIDEMIA LDL GOAL <100: ICD-10-CM

## 2022-01-27 DIAGNOSIS — I10 ESSENTIAL HYPERTENSION: ICD-10-CM

## 2022-01-27 PROCEDURE — 99214 OFFICE O/P EST MOD 30 MIN: CPT | Mod: GC | Performed by: INTERNAL MEDICINE

## 2022-01-27 PROCEDURE — G0463 HOSPITAL OUTPT CLINIC VISIT: HCPCS

## 2022-01-27 RX ORDER — LOSARTAN POTASSIUM 100 MG/1
100 TABLET ORAL DAILY
Qty: 90 TABLET | Refills: 3 | Status: SHIPPED | OUTPATIENT
Start: 2022-01-27 | End: 2022-10-31

## 2022-01-27 RX ORDER — EZETIMIBE 10 MG/1
10 TABLET ORAL DAILY
Qty: 90 TABLET | Refills: 3 | Status: SHIPPED | OUTPATIENT
Start: 2022-01-27 | End: 2023-03-28

## 2022-01-27 RX ORDER — AMLODIPINE BESYLATE 10 MG/1
10 TABLET ORAL DAILY
Qty: 90 TABLET | Refills: 3 | Status: SHIPPED | OUTPATIENT
Start: 2022-01-27 | End: 2022-10-31

## 2022-01-27 RX ORDER — ATORVASTATIN CALCIUM 40 MG/1
40 TABLET, FILM COATED ORAL DAILY
Qty: 90 TABLET | Refills: 3 | Status: SHIPPED | OUTPATIENT
Start: 2022-01-27 | End: 2024-06-24

## 2022-01-27 ASSESSMENT — PAIN SCALES - GENERAL: PAINLEVEL: NO PAIN (0)

## 2022-01-27 ASSESSMENT — MIFFLIN-ST. JEOR: SCORE: 1766.08

## 2022-01-27 NOTE — PATIENT INSTRUCTIONS
January 27, 2022    Cardiology Provider You Saw During Your Visit: Dr. Smallwood      Medication Changes:   -Increase amlodipine to 10 mg       Follow Up:   -Schedule Echocardiogram as soon as able  -Follow up with Dr. Smallwood in 6 months with fasting labs prior      Labs:   Results for SAM VILLALBA (MRN [id number]) as of 1/27/2022 15:26   Ref. Range 1/24/2022 09:11   Sodium Latest Ref Range: 133 - 144 mmol/L 141   Potassium Latest Ref Range: 3.4 - 5.3 mmol/L 4.1   Chloride Latest Ref Range: 94 - 109 mmol/L 104   Carbon Dioxide Latest Ref Range: 20 - 32 mmol/L 27   Urea Nitrogen Latest Ref Range: 7 - 30 mg/dL 20   Creatinine Latest Ref Range: 0.66 - 1.25 mg/dL 1.10   GFR Estimate Latest Ref Range: >60 mL/min/1.73m2 76   Calcium Latest Ref Range: 8.5 - 10.1 mg/dL 9.4   Anion Gap Latest Ref Range: 3 - 14 mmol/L 10   Albumin Latest Ref Range: 3.4 - 5.0 g/dL 3.8   Protein Total Latest Ref Range: 6.8 - 8.8 g/dL 7.7   Bilirubin Total Latest Ref Range: 0.2 - 1.3 mg/dL 0.6   Alkaline Phosphatase Latest Ref Range: 40 - 150 U/L 55   ALT Latest Ref Range: 0 - 70 U/L 50   AST Latest Ref Range: 0 - 45 U/L 26   Cholesterol Latest Ref Range: <200 mg/dL 145   Patient Fasting > 8hrs? Unknown Yes   HDL Cholesterol Latest Ref Range: >=40 mg/dL 56   LDL Cholesterol Calculated Latest Ref Range: <=100 mg/dL 79   Non HDL Cholesterol Latest Ref Range: <130 mg/dL 89   Triglycerides Latest Ref Range: <150 mg/dL 52   Glucose Latest Ref Range: 70 - 99 mg/dL 93           Follow the American Heart Association Diet and Lifestyle Recommendations:  -Limit saturated fat, trans fat, sodium, red meat, sweets and sugar-sweetened beverages. If you choose to eat red meat, compare labels and select the leanest cuts available.  -Aim for at least 150 minutes of moderate physical activity or 75 minutes of vigorous physical activity - or an equal combination of both - each week.      To Reach Us:  -During business hours: [phone], press option # 1 to be  "routed to the [facility] then option # 4 for \"To send a message to your care team\"     -After hours, weekends or holidays: [phone], press option #4 and ask to speak to the on-call cardiologist. Inform them you are a patient at the [facility].      Giselle Blake RN  Cardiology Care Coordinator - General Cardiology  [facility] [place]    "

## 2022-01-27 NOTE — LETTER
1/27/2022      RE: Yasir Hines  [address] [facility] [address]       Dear Colleague,    Thank you for the opportunity to participate in the care of your patient, Yasir Hines, at the [facility] [place] [facility]. Please see a copy of my visit note below.    HPI: Mr Yasir Hines, who is a 61 yr old male patient with a Hx of hypercholesterolemia, hypertension who has been very active and complaints about palpitations and rest and during effort     On 07-22-21 patient developed a syncope and was seen at ER in [place] - (results in care everywhere and got ten a cardiovascular work up in [facility] - see echocardiogram/labs).     He has been an avid runner - because of the palpitations he has reduced his effort.  Patient has hypertension and hypercholesterolemia.  Patient has never smoked.  Patient 5 Standard drinks or equivalent per week.     During last encounter, we initiated amlodipine 5mg at bedtime, losartan 100mg once daily, aspirin 81mg once daily.  We also obtained a CT CAC, which showed a CAC score of 319 placing patient in 83rd percentile for risk of developing CAD.  We recommended continuing atorvastatin 80 mg and ezetimibe 10 mg daily.  Goal LDL cholesterol less than 70.  Recent LDL cholesterol showed 79 mg/dL.    Patient denies chest patient, shortness of breath and intermittent claudication.  Continues to be physically active without symptoms.  Reports no myalgias/muscle aches.  Occasionally is lightheaded when moving from sitting to standing position quickly, but is very manageable especially the gets up slower.        PAST MEDICAL HISTORY:  Hypertension  Hypercholesterolemia  Syncope, collapse  Lumbar radiculopathy  Past Medical History:   Diagnosis Date     Hypertension        CURRENT MEDICATIONS:  Current Outpatient Medications   Medication Sig Dispense Refill     amLODIPine (NORVASC) 5 MG tablet Take 1 tablet (5  mg) by mouth daily 90 tablet 0     aspirin (ASA) 81 MG EC tablet Take 1 tablet (81 mg) by mouth daily 90 tablet 1     atorvastatin (LIPITOR) 40 MG tablet Take 1 tablet by mouth daily       cyclobenzaprine (FLEXERIL) 5 MG tablet Take 1-2 tablets (5-10 mg) by mouth 3 times daily as needed for muscle spasms 90 tablet 1     ezetimibe (ZETIA) 10 MG tablet Take 1 tablet (10 mg) by mouth daily 90 tablet 3     gabapentin (NEURONTIN) 300 MG capsule Take 1 capsule (300 mg) by mouth 3 times daily 90 capsule 1     Glucosamine Sulfate 1000 MG TABS        losartan (COZAAR) 100 MG tablet Take 1 tablet (100 mg) by mouth daily 90 tablet 1     meloxicam (MOBIC) 7.5 MG tablet Take 2 tablets (15 mg) by mouth daily 60 tablet 1     omega 3 1000 MG CAPS        tiZANidine (ZANAFLEX) 2 MG tablet Take 1 tablet (2 mg) by mouth 3 times daily as needed for muscle spasms 30 tablet 0       PAST SURGICAL HISTORY:  Past Surgical History:   Procedure Laterality Date     INJECT EPIDURAL LUMBAR / SACRAL SINGLE N/A 11/23/2021    Procedure: Lumbar 4 5 Interlaminal Epidural Steroid Injection @;  Surgeon: Vaibhav Rucker MD;  Location: [facility] OR       ALLERGIES   No Known Allergies    FAMILY HISTORY:    Family History of Colon Cancer, Father, one Grandmother, last exam 5/12/14, one Sessile serrated adenoma; 5 yr interval 3/10/2010     Family History of Pancreatic Cancer, Maternal GM 3/10/2010       SOCIAL HISTORY:  Social History     Socioeconomic History     Marital status:      Spouse name: Not on file     Number of children: Not on file     Years of education: Not on file     Highest education level: Not on file   Occupational History     Not on file   Tobacco Use     Smoking status: Never Smoker     Smokeless tobacco: Never Used   Substance and Sexual Activity     Alcohol use: Yes     Comment: occasionally     Drug use: Never     Sexual activity: Not on file   Other Topics Concern     Not on file   Social History Narrative     Not on file  "    Social Determinants of Health     Financial Resource Strain: Not on file   Food Insecurity: Not on file   Transportation Needs: Not on file   Physical Activity: Not on file   Stress: Not on file   Social Connections: Not on file   Intimate Partner Violence: Not on file   Housing Stability: Not on file       ROS:   Constitutional: No fever, chills, or sweats. No weight gain/loss   ENT: No visual disturbance, ear ache, epistaxis, sore throat  Allergies/Immunologic: Negative.   Respiratory: No cough, hemoptysia  Cardiovascular: As per HPI  GI: No nausea, vomiting, hematemesis, melena, or hematochezia  : No urinary frequency, dysuria, or hematuria  Integument: Negative  Psychiatric: Negative  Neuro: Negative  Endocrinology: Negative   Musculoskeletal: Negative    EXAM:  BP (!) 160/76 (BP Location: Right arm, Patient Position: Chair, Cuff Size: Adult Large)   Pulse 74   Ht 1.803 m (5' 11\")   Wt 93.9 kg (207 lb)   SpO2 96%   BMI 28.87 kg/m    In general, the patient is a pleasant male in no apparent distress.    HEENT: NC/AT.  PERRLA.  EOMI.  Sclerae white, not injected.  Nares clear.  Pharynx without erythema or exudate.  Dentition intact.    Neck: No adenopathy.  No thyromegaly. Carotids +4/4 bilaterally without bruits.  No jugular venous distension.   Heart: RRR. Normal S1, S2 splits physiologically. Grade II systolic murmur at RUSB, Grade III blowing systolic murmur at apex, no rub, click, or gallop. The PMI is in the 5th ICS in the midclavicular line. There is no heave.    Lungs: CTA.  No ronchi, wheezes, rales.  No dullness to percussion.   Abdomen: Soft, nontender, nondistended. No organomegaly.  No bruits.   Extremities: No clubbing, cyanosis, or edema.  The pulses are +4/4 at the radial, brachial, femoral, popliteal, DP, and PT sites bilaterally.  No bruits are noted.  Neurologic: Alert and oriented to person/place/time, normal speech, gait and affect  Skin: No petechiae, purpura or rash.    At the end " of the visit  BP right 144/82 ; left 146/80    Labs:  LIPID RESULTS:  Lab Results   Component Value Date    CHOL 145 01/24/2022    HDL 56 01/24/2022    LDL 79 01/24/2022    TRIG 52 01/24/2022    NHDL 89 01/24/2022       LIVER ENZYME RESULTS:  Lab Results   Component Value Date    AST 26 01/24/2022    ALT 50 01/24/2022       BMP RESULTS:  Lab Results   Component Value Date     01/24/2022    POTASSIUM 4.1 01/24/2022    CHLORIDE 104 01/24/2022    CO2 27 01/24/2022    ANIONGAP 10 01/24/2022    GLC 93 01/24/2022    BUN 20 01/24/2022    CR 1.10 01/24/2022    GFRESTIMATED 76 01/24/2022    PAMELA 9.4 01/24/2022          Procedures:    Echocardiogram 08-31-21  1. Normal left ventricular chamber size.  Estimated left ventricular ejection fraction is   60-65%.    2. No regional wall motion abnormalities.    3. Mild calcific aortic valve stenosis.  Aortic valve systolic peak velocity is 2.3 m/sec.     Aortic valve systolic mean gradient is 11.4  mmHg.    4. Severe left atrial enlargement.    5. Estimated right ventricular systolic pressure is 33.4 mmHg.     Echocardiogram 02-    Global and regional left ventricular function is normal with an EF of 60-65%.  Right ventricular function, chamber size, wall motion, and thickness are  normal.  Pulmonary artery systolic pressure cannot be assessed.  The inferior vena cava is normal.  No pericardial effusion is present.  There is no prior study for direct comparison.  ______________________________________________________________________________  Left Ventricle  Global and regional left ventricular function is normal with an EF of 60-65%.  Left ventricular wall thickness is normal. Left ventricular size is normal.  Left ventricular diastolic function is normal. No regional wall motion  abnormalities are seen.     Right Ventricle  Right ventricular function, chamber size, wall motion, and thickness are  normal.     Atria  The right atria appears normal. Mild left atrial  enlargement is present.     Mitral Valve  The mitral valve is normal. Trace mitral insufficiency is present.     Aortic Valve  Trileaflet aortic sclerosis without stenosis. The mean AoV pressure gradient  is 12.2 mmHg. The calculated aortic valve are is 2.0 cm^2.     Tricuspid Valve  The tricuspid valve is normal. Trace tricuspid insufficiency is present.  Pulmonary artery systolic pressure cannot be assessed.     Pulmonic Valve  The pulmonic valve is normal.     Vessels  The thoracic aorta is normal. The pulmonary artery and bifurcation cannot be  assessed. The inferior vena cava is normal.     Pericardium  No pericardial effusion is present.     Compared to Previous Study  There is no prior study for direct comparison.  ______________________________________________________________________________  MMode/2D Measurements & Calculations     IVSd: 1.1 cm  LVIDd: 5.0 cm  LVIDs: 3.0 cm  LVPWd: 0.97 cm  FS: 41.1 %  LV mass(C)d: 190.7 grams  LV mass(C)dI: 89.1 grams/m2  Ao root diam: 3.3 cm  asc Aorta Diam: 3.3 cm  LVOT diam: 2.1 cm  LVOT area: 3.5 cm2  LA Volume (BP): 79.0 ml  LA Volume Index (BP): 36.9 ml/m2  RWT: 0.38     Doppler Measurements & Calculations  MV E max harish: 55.3 cm/sec  MV A max harish: 70.8 cm/sec  MV E/A: 0.78  MV dec slope: 204.0 cm/sec2  MV dec time: 0.27 sec  Ao V2 max: 237.8 cm/sec  Ao max P.6 mmHg  Ao V2 mean: 165.3 cm/sec  Ao mean P.2 mmHg  Ao V2 VTI: 45.4 cm  OMID(I,D): 2.0 cm2  OMID(V,D): 1.7 cm2  LV V1 max P.7 mmHg  LV V1 max: 119.5 cm/sec  LV V1 VTI: 26.2 cm  SV(LVOT): 91.2 ml  SI(LVOT): 42.6 ml/m2  PA V2 max: 172.4 cm/sec  PA max P.9 mmHg  PA acc time: 0.12 sec  PI end-d harish: 127.6 cm/sec  AV Harish Ratio (DI): 0.50  OMID Index (cm2/m2): 0.94  E/E' av.6  Lateral E/e': 4.8  Medial E/e': 8.3    Exam: Bilateral carotid duplex Doppler ultrasound dated 2021 8:18  AM  Impression:     1. Right side      Degree of stenosis of the internal carotid artery: Normal.     2. Left side:           Degree of stenosis of the internal carotid artery: Normal.    Exam: Duplex Doppler ultrasound of the kidneys and bilateral renal arteries dated 9/2/2021 8:18 AM.  Impression:     1. Right kidney:     1a. Renal parenchyma: Normal.  1b. Renal artery: Patent with no evidence for stenosis.     2. Left kidney:     2a. Renal parenchyma: Normal.  2b. Renal artery: Patent with no evidence for stenosis.     [place]  08-30-21     Sin rhythm, avg HR 65 bpm, max , min HR 39, 6 periods SVT, idioventricular ryhtm , isolated SVES 5.3 %, < 1 % SVES, rare VES, couplets and triplets.     Procedure: CT CALCIUM SCREENING   Examination Date: 10/6/2021 9:52 AM      IMPRESSIONS:  1.  Moderate coronary calcifications.  2.  The total Agatston calcium score is 319 placing the patient in the  83rd percentile when compared to age and gender matched control group.  3.  Recommend aggressive risk factor modification.  4.  Please review the separate Radiology report for incidental  noncardiac findings.     FINDINGS:     CORONARY ARTERY CALCIUM SCORES:   Total calcium score: 319  Left main coronary artery: 0  Left anterior descending coronary artery: 294  Circumflex coronary artery: 24  Right coronary artery: 1     CALCIUM SCORE -399  If the patient has more than one cardiac risk factor (male age >45,  female age >55,hypertension, smoking, high cholesterol, low HDL,  family history of heart disease) then the risk of coronary heart  disease, death or myocardial infarction is 1.3% per year (RAY Manning. et al. [facility], 2007; [id number]). The more calcium is detected, the  higher is the likelihood for an obstructive coronary disease. However,  there is no unique relationship between the amount of detected calcium  and the extent of or localization of coronary artery disease. If there  are any cardiac symptoms (for example chest pain/ pressure or  shortness of breath) then immediate medical attention is necessary      Assessment  and Plan: In summary, 61-year-old man with hypertension and dyslipidemia with recent CAC score of 319 (predominantly in the LAD) presenting for follow-up evaluation.  Lipids have improved with higher intensity statin and ezetimibe.  Blood pressure has additionally improved on combination losartan/amlodipine.  This patient has not been experiencing significant side effects from higher intensity statin at this time, will continue current plan of care.  Encourage patient to follow healthy lifestyle including balanced diet and moderate intensity physical activity on a regular basis.  Never smoker, reinforced importance.    #Coronary artery disease without angina pectoralis  #Dyslipidemia  #Hypertension  #At least mild mitral regurgitation  #Mild Calcific AS    We discussed following points:    Medication Changes:   -Increase amlodipine to 10 mg         Follow Up:   -Schedule Echocardiogram as soon as able  -Follow up with Dr. Smallwood in 6 months with fasting labs prior      Jaison De Luna MD, MSc  Cardiovascular Disease Fellow  [facility]    I evaluated and examined patient with CV fellow.  I reviewed discussed the tests of vital signs and monitoring, lab tests, EKG, results of imaging and other tests with patient and  CV fellow.  I discussed the assessment and therapeutic plan with patient and CV fellow.  I edited the note to make it a more cohesive document .    CC  Patient Care Team:  Hay Melendez as PCP - General (Internal Medicine)  Jrery Smallwood MD as MD (Cardiovascular Disease)  Jerry Smallwood MD as Assigned Heart and Vascular Provider  Giselle Blake, RN as Specialty Care Coordinator (Cardiology)  SELF, REFERRED      Please do not hesitate to contact me if you have any questions/concerns.     Sincerely,     Jerry Smallwood MD

## 2022-01-27 NOTE — PROGRESS NOTES
HPI: Mr Yasir Hines, who is a 61 yr old male patient with a Hx of hypercholesterolemia, hypertension who has been very active and complaints about palpitations and rest and during effort     On 07-22-21 patient developed a syncope and was seen at ER in [place] - (results in care everywhere and got ten a cardiovascular work up in [facility] - see echocardiogram/labs).     He has been an avid runner - because of the palpitations he has reduced his effort.  Patient has hypertension and hypercholesterolemia.  Patient has never smoked.  Patient 5 Standard drinks or equivalent per week.     During last encounter, we initiated amlodipine 5mg at bedtime, losartan 100mg once daily, aspirin 81mg once daily.  We also obtained a CT CAC, which showed a CAC score of 319 placing patient in 83rd percentile for risk of developing CAD.  We recommended continuing atorvastatin 80 mg and ezetimibe 10 mg daily.  Goal LDL cholesterol less than 70.  Recent LDL cholesterol showed 79 mg/dL.    Patient denies chest patient, shortness of breath and intermittent claudication.  Continues to be physically active without symptoms.  Reports no myalgias/muscle aches.  Occasionally is lightheaded when moving from sitting to standing position quickly, but is very manageable especially the gets up slower.        PAST MEDICAL HISTORY:  Hypertension  Hypercholesterolemia  Syncope, collapse  Lumbar radiculopathy  Past Medical History:   Diagnosis Date     Hypertension        CURRENT MEDICATIONS:  Current Outpatient Medications   Medication Sig Dispense Refill     amLODIPine (NORVASC) 5 MG tablet Take 1 tablet (5 mg) by mouth daily 90 tablet 0     aspirin (ASA) 81 MG EC tablet Take 1 tablet (81 mg) by mouth daily 90 tablet 1     atorvastatin (LIPITOR) 40 MG tablet Take 1 tablet by mouth daily       cyclobenzaprine (FLEXERIL) 5 MG tablet Take 1-2 tablets (5-10 mg) by mouth 3 times daily as needed for muscle spasms 90 tablet 1     ezetimibe (ZETIA) 10 MG  tablet Take 1 tablet (10 mg) by mouth daily 90 tablet 3     gabapentin (NEURONTIN) 300 MG capsule Take 1 capsule (300 mg) by mouth 3 times daily 90 capsule 1     Glucosamine Sulfate 1000 MG TABS        losartan (COZAAR) 100 MG tablet Take 1 tablet (100 mg) by mouth daily 90 tablet 1     meloxicam (MOBIC) 7.5 MG tablet Take 2 tablets (15 mg) by mouth daily 60 tablet 1     omega 3 1000 MG CAPS        tiZANidine (ZANAFLEX) 2 MG tablet Take 1 tablet (2 mg) by mouth 3 times daily as needed for muscle spasms 30 tablet 0       PAST SURGICAL HISTORY:  Past Surgical History:   Procedure Laterality Date     INJECT EPIDURAL LUMBAR / SACRAL SINGLE N/A 11/23/2021    Procedure: Lumbar 4 5 Interlaminal Epidural Steroid Injection @;  Surgeon: Vaibhav Rucker MD;  Location: [facility] OR       ALLERGIES   No Known Allergies    FAMILY HISTORY:    Family History of Colon Cancer, Father, one Grandmother, last exam 5/12/14, one Sessile serrated adenoma; 5 yr interval 3/10/2010     Family History of Pancreatic Cancer, Maternal GM 3/10/2010       SOCIAL HISTORY:  Social History     Socioeconomic History     Marital status:      Spouse name: Not on file     Number of children: Not on file     Years of education: Not on file     Highest education level: Not on file   Occupational History     Not on file   Tobacco Use     Smoking status: Never Smoker     Smokeless tobacco: Never Used   Substance and Sexual Activity     Alcohol use: Yes     Comment: occasionally     Drug use: Never     Sexual activity: Not on file   Other Topics Concern     Not on file   Social History Narrative     Not on file     Social Determinants of Health     Financial Resource Strain: Not on file   Food Insecurity: Not on file   Transportation Needs: Not on file   Physical Activity: Not on file   Stress: Not on file   Social Connections: Not on file   Intimate Partner Violence: Not on file   Housing Stability: Not on file       ROS:   Constitutional: No fever,  "chills, or sweats. No weight gain/loss   ENT: No visual disturbance, ear ache, epistaxis, sore throat  Allergies/Immunologic: Negative.   Respiratory: No cough, hemoptysia  Cardiovascular: As per HPI  GI: No nausea, vomiting, hematemesis, melena, or hematochezia  : No urinary frequency, dysuria, or hematuria  Integument: Negative  Psychiatric: Negative  Neuro: Negative  Endocrinology: Negative   Musculoskeletal: Negative    EXAM:  BP (!) 160/76 (BP Location: Right arm, Patient Position: Chair, Cuff Size: Adult Large)   Pulse 74   Ht 1.803 m (5' 11\")   Wt 93.9 kg (207 lb)   SpO2 96%   BMI 28.87 kg/m    In general, the patient is a pleasant male in no apparent distress.    HEENT: NC/AT.  PERRLA.  EOMI.  Sclerae white, not injected.  Nares clear.  Pharynx without erythema or exudate.  Dentition intact.    Neck: No adenopathy.  No thyromegaly. Carotids +4/4 bilaterally without bruits.  No jugular venous distension.   Heart: RRR. Normal S1, S2 splits physiologically. Grade II systolic murmur at RUSB, Grade III blowing systolic murmur at apex, no rub, click, or gallop. The PMI is in the 5th ICS in the midclavicular line. There is no heave.    Lungs: CTA.  No ronchi, wheezes, rales.  No dullness to percussion.   Abdomen: Soft, nontender, nondistended. No organomegaly.  No bruits.   Extremities: No clubbing, cyanosis, or edema.  The pulses are +4/4 at the radial, brachial, femoral, popliteal, DP, and PT sites bilaterally.  No bruits are noted.  Neurologic: Alert and oriented to person/place/time, normal speech, gait and affect  Skin: No petechiae, purpura or rash.    At the end of the visit  BP right 144/82 ; left 146/80    Labs:  LIPID RESULTS:  Lab Results   Component Value Date    CHOL 145 01/24/2022    HDL 56 01/24/2022    LDL 79 01/24/2022    TRIG 52 01/24/2022    NHDL 89 01/24/2022       LIVER ENZYME RESULTS:  Lab Results   Component Value Date    AST 26 01/24/2022    ALT 50 01/24/2022       BMP RESULTS:  Lab " Results   Component Value Date     01/24/2022    POTASSIUM 4.1 01/24/2022    CHLORIDE 104 01/24/2022    CO2 27 01/24/2022    ANIONGAP 10 01/24/2022    GLC 93 01/24/2022    BUN 20 01/24/2022    CR 1.10 01/24/2022    GFRESTIMATED 76 01/24/2022    PAMELA 9.4 01/24/2022          Procedures:    Echocardiogram 08-31-21  1. Normal left ventricular chamber size.  Estimated left ventricular ejection fraction is   60-65%.    2. No regional wall motion abnormalities.    3. Mild calcific aortic valve stenosis.  Aortic valve systolic peak velocity is 2.3 m/sec.     Aortic valve systolic mean gradient is 11.4  mmHg.    4. Severe left atrial enlargement.    5. Estimated right ventricular systolic pressure is 33.4 mmHg.     Echocardiogram 02-    Global and regional left ventricular function is normal with an EF of 60-65%.  Right ventricular function, chamber size, wall motion, and thickness are  normal.  Pulmonary artery systolic pressure cannot be assessed.  The inferior vena cava is normal.  No pericardial effusion is present.  There is no prior study for direct comparison.  ______________________________________________________________________________  Left Ventricle  Global and regional left ventricular function is normal with an EF of 60-65%.  Left ventricular wall thickness is normal. Left ventricular size is normal.  Left ventricular diastolic function is normal. No regional wall motion  abnormalities are seen.     Right Ventricle  Right ventricular function, chamber size, wall motion, and thickness are  normal.     Atria  The right atria appears normal. Mild left atrial enlargement is present.     Mitral Valve  The mitral valve is normal. Trace mitral insufficiency is present.     Aortic Valve  Trileaflet aortic sclerosis without stenosis. The mean AoV pressure gradient  is 12.2 mmHg. The calculated aortic valve are is 2.0 cm^2.     Tricuspid Valve  The tricuspid valve is normal. Trace tricuspid insufficiency is  present.  Pulmonary artery systolic pressure cannot be assessed.     Pulmonic Valve  The pulmonic valve is normal.     Vessels  The thoracic aorta is normal. The pulmonary artery and bifurcation cannot be  assessed. The inferior vena cava is normal.     Pericardium  No pericardial effusion is present.     Compared to Previous Study  There is no prior study for direct comparison.  ______________________________________________________________________________  MMode/2D Measurements & Calculations     IVSd: 1.1 cm  LVIDd: 5.0 cm  LVIDs: 3.0 cm  LVPWd: 0.97 cm  FS: 41.1 %  LV mass(C)d: 190.7 grams  LV mass(C)dI: 89.1 grams/m2  Ao root diam: 3.3 cm  asc Aorta Diam: 3.3 cm  LVOT diam: 2.1 cm  LVOT area: 3.5 cm2  LA Volume (BP): 79.0 ml  LA Volume Index (BP): 36.9 ml/m2  RWT: 0.38     Doppler Measurements & Calculations  MV E max harish: 55.3 cm/sec  MV A max harish: 70.8 cm/sec  MV E/A: 0.78  MV dec slope: 204.0 cm/sec2  MV dec time: 0.27 sec  Ao V2 max: 237.8 cm/sec  Ao max P.6 mmHg  Ao V2 mean: 165.3 cm/sec  Ao mean P.2 mmHg  Ao V2 VTI: 45.4 cm  OMID(I,D): 2.0 cm2  OMID(V,D): 1.7 cm2  LV V1 max P.7 mmHg  LV V1 max: 119.5 cm/sec  LV V1 VTI: 26.2 cm  SV(LVOT): 91.2 ml  SI(LVOT): 42.6 ml/m2  PA V2 max: 172.4 cm/sec  PA max P.9 mmHg  PA acc time: 0.12 sec  PI end-d harish: 127.6 cm/sec  AV Harish Ratio (DI): 0.50  OMID Index (cm2/m2): 0.94  E/E' av.6  Lateral E/e': 4.8  Medial E/e': 8.3    Exam: Bilateral carotid duplex Doppler ultrasound dated 2021 8:18  AM  Impression:     1. Right side      Degree of stenosis of the internal carotid artery: Normal.     2. Left side:          Degree of stenosis of the internal carotid artery: Normal.    Exam: Duplex Doppler ultrasound of the kidneys and bilateral renal arteries dated 2021 8:18 AM.  Impression:     1. Right kidney:     1a. Renal parenchyma: Normal.  1b. Renal artery: Patent with no evidence for stenosis.     2. Left kidney:     2a. Renal parenchyma:  Normal.  2b. Renal artery: Patent with no evidence for stenosis.     [place]  08-30-21     Sin rhythm, avg HR 65 bpm, max , min HR 39, 6 periods SVT, idioventricular ryhtm , isolated SVES 5.3 %, < 1 % SVES, rare VES, couplets and triplets.     Procedure: CT CALCIUM SCREENING   Examination Date: 10/6/2021 9:52 AM      IMPRESSIONS:  1.  Moderate coronary calcifications.  2.  The total Agatston calcium score is 319 placing the patient in the  83rd percentile when compared to age and gender matched control group.  3.  Recommend aggressive risk factor modification.  4.  Please review the separate Radiology report for incidental  noncardiac findings.     FINDINGS:     CORONARY ARTERY CALCIUM SCORES:   Total calcium score: 319  Left main coronary artery: 0  Left anterior descending coronary artery: 294  Circumflex coronary artery: 24  Right coronary artery: 1     CALCIUM SCORE -399  If the patient has more than one cardiac risk factor (male age >45,  female age >55,hypertension, smoking, high cholesterol, low HDL,  family history of heart disease) then the risk of coronary heart  disease, death or myocardial infarction is 1.3% per year (RAY Manning. et al. [facility], 2007; 49:378-402). The more calcium is detected, the  higher is the likelihood for an obstructive coronary disease. However,  there is no unique relationship between the amount of detected calcium  and the extent of or localization of coronary artery disease. If there  are any cardiac symptoms (for example chest pain/ pressure or  shortness of breath) then immediate medical attention is necessary      Assessment and Plan: In summary, 61-year-old man with hypertension and dyslipidemia with recent CAC score of 319 (predominantly in the LAD) presenting for follow-up evaluation.  Lipids have improved with higher intensity statin and ezetimibe.  Blood pressure has additionally improved on combination losartan/amlodipine.  This patient has not been  experiencing significant side effects from higher intensity statin at this time, will continue current plan of care.  Encourage patient to follow healthy lifestyle including balanced diet and moderate intensity physical activity on a regular basis.  Never smoker, reinforced importance.    #Coronary artery disease without angina pectoralis  #Dyslipidemia  #Hypertension  #At least mild mitral regurgitation  #Mild Calcific AS    We discussed following points:    Medication Changes:   -Increase amlodipine to 10 mg         Follow Up:   -Schedule Echocardiogram as soon as able  -Follow up with Dr. Smallwood in 6 months with fasting labs prior      Jaison De Luna MD, MSc  Cardiovascular Disease Fellow  [facility]    I evaluated and examined patient with CV fellow.  I reviewed discussed the tests of vital signs and monitoring, lab tests, EKG, results of imaging and other tests with patient and  CV fellow.  I discussed the assessment and therapeutic plan with patient and CV fellow.  I edited the note to make it a more cohesive document .    CC  Patient Care Team:  Hay Melendez as PCP - General (Internal Medicine)  Jerry Smallwood MD as MD (Cardiovascular Disease)  Jerry Smallwood MD as Assigned Heart and Vascular Provider  Giselle Blake RN as Specialty Care Coordinator (Cardiology)  SELF, REFERRED

## 2022-01-27 NOTE — NURSING NOTE
January 27, 2022    Medication Changes:   -Increase amlodipine to 10 mg       Follow Up:   -Schedule Echocardiogram as soon as able  -Follow up with Dr. Smallwood in 6 months with fasting labs prior    Patient verbalized understanding of all health information given and agreed to call with further questions or concerns.      Giselle Blake RN

## 2022-01-27 NOTE — NURSING NOTE
Chief Complaint   Patient presents with     Follow Up     Cl follow up     Vitals were taken and medications reconciled.    Sarthak Hernandez, JYOTSNA  2:05 PM

## 2022-02-02 ENCOUNTER — ANCILLARY PROCEDURE (OUTPATIENT)
Dept: CARDIOLOGY | Facility: CLINIC | Age: 62
End: 2022-02-02
Attending: INTERNAL MEDICINE
Payer: COMMERCIAL

## 2022-02-02 DIAGNOSIS — R55 SYNCOPE, UNSPECIFIED SYNCOPE TYPE: ICD-10-CM

## 2022-02-02 LAB — LVEF ECHO: NORMAL

## 2022-02-02 PROCEDURE — 93306 TTE W/DOPPLER COMPLETE: CPT | Performed by: INTERNAL MEDICINE

## 2022-04-05 DIAGNOSIS — I10 ESSENTIAL HYPERTENSION: ICD-10-CM

## 2022-04-05 DIAGNOSIS — R55 SYNCOPE, UNSPECIFIED SYNCOPE TYPE: ICD-10-CM

## 2022-04-06 NOTE — TELEPHONE ENCOUNTER
Last Clinic Visit: 1/27/2022  [facility]

## 2022-05-15 ENCOUNTER — HEALTH MAINTENANCE LETTER (OUTPATIENT)
Age: 62
End: 2022-05-15

## 2022-06-20 DIAGNOSIS — M54.16 LUMBAR RADICULOPATHY: ICD-10-CM

## 2022-06-20 RX ORDER — CYCLOBENZAPRINE HCL 5 MG
5-10 TABLET ORAL 3 TIMES DAILY PRN
Qty: 90 TABLET | Refills: 1 | Status: SHIPPED | OUTPATIENT
Start: 2022-06-20

## 2022-06-20 RX ORDER — MELOXICAM 7.5 MG/1
15 TABLET ORAL DAILY
Qty: 60 TABLET | Refills: 1 | Status: SHIPPED | OUTPATIENT
Start: 2022-06-20

## 2022-06-20 RX ORDER — GABAPENTIN 300 MG/1
300 CAPSULE ORAL 3 TIMES DAILY
Qty: 90 CAPSULE | Refills: 1 | Status: SHIPPED | OUTPATIENT
Start: 2022-06-20 | End: 2022-07-26

## 2022-07-26 DIAGNOSIS — M54.16 LUMBAR RADICULOPATHY: ICD-10-CM

## 2022-07-26 RX ORDER — GABAPENTIN 300 MG/1
300 CAPSULE ORAL 3 TIMES DAILY
Qty: 270 CAPSULE | Refills: 3 | Status: SHIPPED | OUTPATIENT
Start: 2022-07-26 | End: 2023-12-04

## 2022-07-27 ENCOUNTER — LAB (OUTPATIENT)
Dept: LAB | Facility: CLINIC | Age: 62
End: 2022-07-27
Payer: COMMERCIAL

## 2022-07-27 DIAGNOSIS — R55 SYNCOPE, UNSPECIFIED SYNCOPE TYPE: ICD-10-CM

## 2022-07-27 LAB
ALBUMIN SERPL-MCNC: 3.7 G/DL (ref 3.4–5)
ALP SERPL-CCNC: 62 U/L (ref 40–150)
ALT SERPL W P-5'-P-CCNC: 38 U/L (ref 0–70)
ANION GAP SERPL CALCULATED.3IONS-SCNC: <1 MMOL/L (ref 3–14)
AST SERPL W P-5'-P-CCNC: 23 U/L (ref 0–45)
BILIRUB SERPL-MCNC: 0.5 MG/DL (ref 0.2–1.3)
BUN SERPL-MCNC: 19 MG/DL (ref 7–30)
CALCIUM SERPL-MCNC: 9.5 MG/DL (ref 8.5–10.1)
CHLORIDE BLD-SCNC: 108 MMOL/L (ref 94–109)
CHOLEST SERPL-MCNC: 128 MG/DL
CO2 SERPL-SCNC: 28 MMOL/L (ref 20–32)
CREAT SERPL-MCNC: 1.1 MG/DL (ref 0.66–1.25)
FASTING STATUS PATIENT QL REPORTED: YES
GFR SERPL CREATININE-BSD FRML MDRD: 76 ML/MIN/1.73M2
GLUCOSE BLD-MCNC: 98 MG/DL (ref 70–99)
HDLC SERPL-MCNC: 44 MG/DL
LDLC SERPL CALC-MCNC: 67 MG/DL
NONHDLC SERPL-MCNC: 84 MG/DL
POTASSIUM BLD-SCNC: 4.3 MMOL/L (ref 3.4–5.3)
PROT SERPL-MCNC: 7.9 G/DL (ref 6.8–8.8)
SODIUM SERPL-SCNC: 134 MMOL/L (ref 133–144)
TRIGL SERPL-MCNC: 85 MG/DL

## 2022-07-27 PROCEDURE — 80061 LIPID PANEL: CPT | Performed by: PATHOLOGY

## 2022-07-27 PROCEDURE — 80053 COMPREHEN METABOLIC PANEL: CPT | Performed by: PATHOLOGY

## 2022-07-27 PROCEDURE — 36415 COLL VENOUS BLD VENIPUNCTURE: CPT | Performed by: PATHOLOGY

## 2022-07-28 ENCOUNTER — OFFICE VISIT (OUTPATIENT)
Dept: CARDIOLOGY | Facility: CLINIC | Age: 62
End: 2022-07-28
Attending: INTERNAL MEDICINE
Payer: COMMERCIAL

## 2022-07-28 VITALS
SYSTOLIC BLOOD PRESSURE: 162 MMHG | HEIGHT: 71 IN | OXYGEN SATURATION: 97 % | HEART RATE: 63 BPM | WEIGHT: 209.1 LBS | DIASTOLIC BLOOD PRESSURE: 80 MMHG | BODY MASS INDEX: 29.27 KG/M2

## 2022-07-28 DIAGNOSIS — R55 SYNCOPE, UNSPECIFIED SYNCOPE TYPE: ICD-10-CM

## 2022-07-28 PROCEDURE — 99214 OFFICE O/P EST MOD 30 MIN: CPT | Performed by: INTERNAL MEDICINE

## 2022-07-28 PROCEDURE — G0463 HOSPITAL OUTPT CLINIC VISIT: HCPCS

## 2022-07-28 ASSESSMENT — PAIN SCALES - GENERAL: PAINLEVEL: NO PAIN (0)

## 2022-07-28 NOTE — LETTER
7/28/2022      RE: Yasir Hines  [address] [address]       Dear Colleague,    Thank you for the opportunity to participate in the care of your patient, Yasir Hines, at the [facility] [place] at . Please see a copy of my visit note below.    HPI:    I had the privilege to evaluate and examine Mr Yasir Hines,  who is a 62 yr old male patient with a Hx of hypercholesterolemia, hypertension who has been very active and complaints about palpitations and rest and during effort   On 07-22-21 patient developed a syncope and was seen at ER in [place] - (results in care everywhere and got ten a cardiovascular work up in [facility] - see echocardiogram/labs).     During last encounter, we initiated amlodipine 5mg at bedtime, losartan 100mg once daily, aspirin 81mg once daily.  We also obtained a CT CAC, which showed a CAC score of 319 placing patient in 83rd percentile for risk of developing CAD.  We recommended continuing atorvastatin 80 mg and ezetimibe 10 mg daily.  Goal LDL cholesterol less than 70.  Recent LDL cholesterol showed 67 mg/dL.     Patient denies chest patient, shortness of breath and intermittent claudication.  Continues to be physically active without symptoms.  Reports no myalgias/muscle aches.      His BP is well controlled.    PAST MEDICAL HISTORY:  Past Medical History:   Diagnosis Date     Hypertension        CURRENT MEDICATIONS:  Current Outpatient Medications   Medication Sig Dispense Refill     amLODIPine (NORVASC) 10 MG tablet Take 1 tablet (10 mg) by mouth daily 90 tablet 3     aspirin (ASPIRIN LOW DOSE) 81 MG EC tablet Take 1 tablet (81 mg) by mouth daily 90 tablet 2     atorvastatin (LIPITOR) 40 MG tablet Take 1 tablet (40 mg) by mouth daily 90 tablet 3     ezetimibe (ZETIA) 10 MG tablet Take 1 tablet (10 mg) by mouth daily 90 tablet 3     gabapentin (NEURONTIN) 300 MG capsule Take 1  capsule (300 mg) by mouth 3 times daily 270 capsule 3     Glucosamine Sulfate 1000 MG TABS        losartan (COZAAR) 100 MG tablet Take 1 tablet (100 mg) by mouth daily 90 tablet 3     meloxicam (MOBIC) 7.5 MG tablet Take 2 tablets (15 mg) by mouth daily 60 tablet 1     omega 3 1000 MG CAPS        cyclobenzaprine (FLEXERIL) 5 MG tablet Take 1-2 tablets (5-10 mg) by mouth 3 times daily as needed for muscle spasms (Patient not taking: Reported on 7/28/2022) 90 tablet 1     tiZANidine (ZANAFLEX) 2 MG tablet Take 1 tablet (2 mg) by mouth 3 times daily as needed for muscle spasms (Patient not taking: No sig reported) 30 tablet 0       PAST SURGICAL HISTORY:  Past Surgical History:   Procedure Laterality Date     INJECT EPIDURAL LUMBAR / SACRAL SINGLE N/A 11/23/2021    Procedure: Lumbar 4 5 Interlaminal Epidural Steroid Injection @;  Surgeon: Vaibhav Rucker MD;  Location: [facility] OR       ALLERGIES   No Known Allergies    FAMILY HISTORY:    Family History of Colon Cancer, Father, one Grandmother, last exam 5/12/14, one Sessile serrated adenoma; 5 yr interval 3/10/2010     Family History of Pancreatic Cancer, Maternal GM 3/10/2010      SOCIAL HISTORY:  Social History     Socioeconomic History     Marital status:      Spouse name: None     Number of children: None     Years of education: None     Highest education level: None   Tobacco Use     Smoking status: Never Smoker     Smokeless tobacco: Never Used   Substance and Sexual Activity     Alcohol use: Yes     Comment: occasionally     Drug use: Never       ROS:   Constitutional: No fever, chills, or sweats. No weight gain/loss   ENT: No visual disturbance, ear ache, epistaxis, sore throat  Allergies/Immunologic: Negative.   Respiratory: No cough, hemoptysia  Cardiovascular: As per HPI  GI: No nausea, vomiting, hematemesis, melena, or hematochezia  : No urinary frequency, dysuria, or hematuria  Integument: Negative  Psychiatric: Negative  Neuro:  "Negative  Endocrinology: Negative   Musculoskeletal: Negative    EXAM:  BP (!) 162/80 (BP Location: Right arm, Patient Position: Chair, Cuff Size: Adult Regular)   Pulse 63   Ht 1.803 m (5' 11\")   Wt 94.8 kg (209 lb 1.6 oz)   SpO2 97%   BMI 29.16 kg/m    In general, the patient is a pleasant male in no apparent distress.    HEENT: NC/AT.  PERRLA.  EOMI.  Sclerae white, not injected.  Nares clear.  Pharynx without erythema or exudate.  Dentition intact.    Neck: No adenopathy.  No thyromegaly. Carotids +4/4 bilaterally without bruits.  No jugular venous distension.   Heart: RRR. Normal S1, S2 splits physiologically. No murmur, rub, click, or gallop. The PMI is in the 5th ICS in the midclavicular line. There is no heave.    Lungs: CTA.  No ronchi, wheezes, rales.  No dullness to percussion.   Abdomen: Soft, nontender, nondistended. No organomegaly.  No bruits.   Extremities: No clubbing, cyanosis, or edema.  The pulses are +4/4 at the radial, brachial, femoral, popliteal, DP, and PT sites bilaterally.  No bruits are noted.  Neurologic: Alert and oriented to person/place/time, normal speech, gait and affect  Skin: No petechiae, purpura or rash.    Labs:  LIPID RESULTS:  Lab Results   Component Value Date    CHOL 128 07/27/2022    HDL 44 07/27/2022    LDL 67 07/27/2022    TRIG 85 07/27/2022    NHDL 84 07/27/2022       LIVER ENZYME RESULTS:  Lab Results   Component Value Date    AST 23 07/27/2022    ALT 38 07/27/2022       BMP RESULTS:  Lab Results   Component Value Date     07/27/2022    POTASSIUM 4.3 07/27/2022    CHLORIDE 108 07/27/2022    CO2 28 07/27/2022    ANIONGAP <1 (L) 07/27/2022    GLC 98 07/27/2022    BUN 19 07/27/2022    CR 1.10 07/27/2022    GFRESTIMATED 76 07/27/2022    PAMELA 9.5 07/27/2022        Procedures:  Echocardiogram  Feb 2022      Global and regional left ventricular function is normal with an EF of 60-65%.  Right ventricular function, chamber size, wall motion, and thickness " are  normal.  Pulmonary artery systolic pressure cannot be assessed.  The inferior vena cava is normal.  No pericardial effusion is present.  There is no prior study for direct comparison.  ______________________________________________________________________________  Left Ventricle  Global and regional left ventricular function is normal with an EF of 60-65%.  Left ventricular wall thickness is normal. Left ventricular size is normal.  Left ventricular diastolic function is normal. No regional wall motion  abnormalities are seen.     Right Ventricle  Right ventricular function, chamber size, wall motion, and thickness are  normal.     Atria  The right atria appears normal. Mild left atrial enlargement is present.     Mitral Valve  The mitral valve is normal. Trace mitral insufficiency is present.     Aortic Valve  Trileaflet aortic sclerosis without stenosis. The mean AoV pressure gradient  is 12.2 mmHg. The calculated aortic valve are is 2.0 cm^2.     Tricuspid Valve  The tricuspid valve is normal. Trace tricuspid insufficiency is present.  Pulmonary artery systolic pressure cannot be assessed.     Pulmonic Valve  The pulmonic valve is normal.     Vessels  The thoracic aorta is normal. The pulmonary artery and bifurcation cannot be  assessed. The inferior vena cava is normal.     Pericardium  No pericardial effusion is present.     Compared to Previous Study  There is no prior study for direct comparison.  ______________________________________________________________________________  MMode/2D Measurements & Calculations     IVSd: 1.1 cm  LVIDd: 5.0 cm  LVIDs: 3.0 cm  LVPWd: 0.97 cm  FS: 41.1 %  LV mass(C)d: 190.7 grams  LV mass(C)dI: 89.1 grams/m2  Ao root diam: 3.3 cm  asc Aorta Diam: 3.3 cm  LVOT diam: 2.1 cm  LVOT area: 3.5 cm2  LA Volume (BP): 79.0 ml  LA Volume Index (BP): 36.9 ml/m2  RWT: 0.38     Doppler Measurements & Calculations  MV E max taylor: 55.3 cm/sec  MV A max taylor: 70.8 cm/sec  MV E/A: 0.78  MV  dec slope: 204.0 cm/sec2  MV dec time: 0.27 sec  Ao V2 max: 237.8 cm/sec  Ao max P.6 mmHg  Ao V2 mean: 165.3 cm/sec  Ao mean P.2 mmHg  Ao V2 VTI: 45.4 cm  OMID(I,D): 2.0 cm2  OMID(V,D): 1.7 cm2  LV V1 max P.7 mmHg  LV V1 max: 119.5 cm/sec  LV V1 VTI: 26.2 cm  SV(LVOT): 91.2 ml  SI(LVOT): 42.6 ml/m2  PA V2 max: 172.4 cm/sec  PA max P.9 mmHg  PA acc time: 0.12 sec  PI end-d harish: 127.6 cm/sec  AV Harish Ratio (DI): 0.50  OMID Index (cm2/m2): 0.94  E/E' av.6  Lateral E/e': 4.8  Medial E/e': 8.3    Assessment and Plan:     We discussed the results with patient.  We discussed the importance of a heart healthy diet and lifestyle.  We continue with same medical regimen.  Follow-up within 1 yr.    Jerry Smallwood MD, PhD  Professor of Medicine  Division of Cardiology          CC  Patient Care Team:  Hay Melendez as PCP - General (Internal Medicine)  Giselle Blake, RN as Specialty Care Coordinator (Cardiology)

## 2022-07-28 NOTE — PATIENT INSTRUCTIONS
July 28, 2022    Cardiology Provider You Saw During Your Visit: Dr. Smallwood      Medication Changes: None      Follow Up: In 1 year with fasting labs prior        Follow the American Heart Association Diet and Lifestyle Recommendations:  -Limit saturated fat, trans fat, sodium, red meat, sweets and sugar-sweetened beverages. If you choose to eat red meat, compare labels and select the leanest cuts available.  -Aim for at least 150 minutes of moderate physical activity or 75 minutes of vigorous physical activity - or an equal combination of both - each week.      To Reach Us:  -During business hours: [phone], press option # 1 to schedule an appointment or to leave a message for your care team.     -After hours, weekends or holidays: [phone], press option #4 and ask to speak to the on-call cardiologist. Inform them you are a patient at the [facility].      Giselle Blake RN  Cardiology Care Coordinator - General Cardiology  [facility]

## 2022-07-28 NOTE — NURSING NOTE
July 28, 2022      Medication Changes: None      Follow Up: In 1 year with fasting labs prior      Patient verbalized understanding of all health information given and agreed to call with further questions or concerns.      Giselle Blake RN

## 2022-08-01 NOTE — PROGRESS NOTES
HPI:    I had the privilege to evaluate and examine Mr Yasir Hines,  who is a 62 yr old male patient with a Hx of hypercholesterolemia, hypertension who has been very active and complaints about palpitations and rest and during effort   On 07-22-21 patient developed a syncope and was seen at ER in [place] - (results in care everywhere and got ten a cardiovascular work up in [facility] - see echocardiogram/labs).     During last encounter, we initiated amlodipine 5mg at bedtime, losartan 100mg once daily, aspirin 81mg once daily.  We also obtained a CT CAC, which showed a CAC score of 319 placing patient in 83rd percentile for risk of developing CAD.  We recommended continuing atorvastatin 80 mg and ezetimibe 10 mg daily.  Goal LDL cholesterol less than 70.  Recent LDL cholesterol showed 67 mg/dL.     Patient denies chest patient, shortness of breath and intermittent claudication.  Continues to be physically active without symptoms.  Reports no myalgias/muscle aches.      His BP is well controlled.    PAST MEDICAL HISTORY:  Past Medical History:   Diagnosis Date     Hypertension        CURRENT MEDICATIONS:  Current Outpatient Medications   Medication Sig Dispense Refill     amLODIPine (NORVASC) 10 MG tablet Take 1 tablet (10 mg) by mouth daily 90 tablet 3     aspirin (ASPIRIN LOW DOSE) 81 MG EC tablet Take 1 tablet (81 mg) by mouth daily 90 tablet 2     atorvastatin (LIPITOR) 40 MG tablet Take 1 tablet (40 mg) by mouth daily 90 tablet 3     ezetimibe (ZETIA) 10 MG tablet Take 1 tablet (10 mg) by mouth daily 90 tablet 3     gabapentin (NEURONTIN) 300 MG capsule Take 1 capsule (300 mg) by mouth 3 times daily 270 capsule 3     Glucosamine Sulfate 1000 MG TABS        losartan (COZAAR) 100 MG tablet Take 1 tablet (100 mg) by mouth daily 90 tablet 3     meloxicam (MOBIC) 7.5 MG tablet Take 2 tablets (15 mg) by mouth daily 60 tablet 1     omega 3 1000 MG CAPS        cyclobenzaprine (FLEXERIL) 5 MG tablet Take 1-2 tablets  "(5-10 mg) by mouth 3 times daily as needed for muscle spasms (Patient not taking: Reported on 7/28/2022) 90 tablet 1     tiZANidine (ZANAFLEX) 2 MG tablet Take 1 tablet (2 mg) by mouth 3 times daily as needed for muscle spasms (Patient not taking: No sig reported) 30 tablet 0       PAST SURGICAL HISTORY:  Past Surgical History:   Procedure Laterality Date     INJECT EPIDURAL LUMBAR / SACRAL SINGLE N/A 11/23/2021    Procedure: Lumbar 4 5 Interlaminal Epidural Steroid Injection @;  Surgeon: Vaibhav Rucker MD;  Location: [facility] OR       ALLERGIES   No Known Allergies    FAMILY HISTORY:    Family History of Colon Cancer, Father, one Grandmother, last exam 5/12/14, one Sessile serrated adenoma; 5 yr interval 3/10/2010     Family History of Pancreatic Cancer, Maternal GM 3/10/2010      SOCIAL HISTORY:  Social History     Socioeconomic History     Marital status:      Spouse name: None     Number of children: None     Years of education: None     Highest education level: None   Tobacco Use     Smoking status: Never Smoker     Smokeless tobacco: Never Used   Substance and Sexual Activity     Alcohol use: Yes     Comment: occasionally     Drug use: Never       ROS:   Constitutional: No fever, chills, or sweats. No weight gain/loss   ENT: No visual disturbance, ear ache, epistaxis, sore throat  Allergies/Immunologic: Negative.   Respiratory: No cough, hemoptysia  Cardiovascular: As per HPI  GI: No nausea, vomiting, hematemesis, melena, or hematochezia  : No urinary frequency, dysuria, or hematuria  Integument: Negative  Psychiatric: Negative  Neuro: Negative  Endocrinology: Negative   Musculoskeletal: Negative    EXAM:  BP (!) 162/80 (BP Location: Right arm, Patient Position: Chair, Cuff Size: Adult Regular)   Pulse 63   Ht 1.803 m (5' 11\")   Wt 94.8 kg (209 lb 1.6 oz)   SpO2 97%   BMI 29.16 kg/m    In general, the patient is a pleasant male in no apparent distress.    HEENT: NC/AT.  SHIRA.  EOMI.  Sclerae " white, not injected.  Nares clear.  Pharynx without erythema or exudate.  Dentition intact.    Neck: No adenopathy.  No thyromegaly. Carotids +4/4 bilaterally without bruits.  No jugular venous distension.   Heart: RRR. Normal S1, S2 splits physiologically. No murmur, rub, click, or gallop. The PMI is in the 5th ICS in the midclavicular line. There is no heave.    Lungs: CTA.  No ronchi, wheezes, rales.  No dullness to percussion.   Abdomen: Soft, nontender, nondistended. No organomegaly.  No bruits.   Extremities: No clubbing, cyanosis, or edema.  The pulses are +4/4 at the radial, brachial, femoral, popliteal, DP, and PT sites bilaterally.  No bruits are noted.  Neurologic: Alert and oriented to person/place/time, normal speech, gait and affect  Skin: No petechiae, purpura or rash.    Labs:  LIPID RESULTS:  Lab Results   Component Value Date    CHOL 128 07/27/2022    HDL 44 07/27/2022    LDL 67 07/27/2022    TRIG 85 07/27/2022    NHDL 84 07/27/2022       LIVER ENZYME RESULTS:  Lab Results   Component Value Date    AST 23 07/27/2022    ALT 38 07/27/2022       BMP RESULTS:  Lab Results   Component Value Date     07/27/2022    POTASSIUM 4.3 07/27/2022    CHLORIDE 108 07/27/2022    CO2 28 07/27/2022    ANIONGAP <1 (L) 07/27/2022    GLC 98 07/27/2022    BUN 19 07/27/2022    CR 1.10 07/27/2022    GFRESTIMATED 76 07/27/2022    PAMELA 9.5 07/27/2022        Procedures:  Echocardiogram  Feb 2022      Global and regional left ventricular function is normal with an EF of 60-65%.  Right ventricular function, chamber size, wall motion, and thickness are  normal.  Pulmonary artery systolic pressure cannot be assessed.  The inferior vena cava is normal.  No pericardial effusion is present.  There is no prior study for direct comparison.  ______________________________________________________________________________  Left Ventricle  Global and regional left ventricular function is normal with an EF of 60-65%.  Left ventricular  wall thickness is normal. Left ventricular size is normal.  Left ventricular diastolic function is normal. No regional wall motion  abnormalities are seen.     Right Ventricle  Right ventricular function, chamber size, wall motion, and thickness are  normal.     Atria  The right atria appears normal. Mild left atrial enlargement is present.     Mitral Valve  The mitral valve is normal. Trace mitral insufficiency is present.     Aortic Valve  Trileaflet aortic sclerosis without stenosis. The mean AoV pressure gradient  is 12.2 mmHg. The calculated aortic valve are is 2.0 cm^2.     Tricuspid Valve  The tricuspid valve is normal. Trace tricuspid insufficiency is present.  Pulmonary artery systolic pressure cannot be assessed.     Pulmonic Valve  The pulmonic valve is normal.     Vessels  The thoracic aorta is normal. The pulmonary artery and bifurcation cannot be  assessed. The inferior vena cava is normal.     Pericardium  No pericardial effusion is present.     Compared to Previous Study  There is no prior study for direct comparison.  ______________________________________________________________________________  MMode/2D Measurements & Calculations     IVSd: 1.1 cm  LVIDd: 5.0 cm  LVIDs: 3.0 cm  LVPWd: 0.97 cm  FS: 41.1 %  LV mass(C)d: 190.7 grams  LV mass(C)dI: 89.1 grams/m2  Ao root diam: 3.3 cm  asc Aorta Diam: 3.3 cm  LVOT diam: 2.1 cm  LVOT area: 3.5 cm2  LA Volume (BP): 79.0 ml  LA Volume Index (BP): 36.9 ml/m2  RWT: 0.38     Doppler Measurements & Calculations  MV E max taylor: 55.3 cm/sec  MV A max taylor: 70.8 cm/sec  MV E/A: 0.78  MV dec slope: 204.0 cm/sec2  MV dec time: 0.27 sec  Ao V2 max: 237.8 cm/sec  Ao max P.6 mmHg  Ao V2 mean: 165.3 cm/sec  Ao mean P.2 mmHg  Ao V2 VTI: 45.4 cm  OMID(I,D): 2.0 cm2  MOID(V,D): 1.7 cm2  LV V1 max P.7 mmHg  LV V1 max: 119.5 cm/sec  LV V1 VTI: 26.2 cm  SV(LVOT): 91.2 ml  SI(LVOT): 42.6 ml/m2  PA V2 max: 172.4 cm/sec  PA max P.9 mmHg  PA acc time: 0.12  sec  PI end-d harish: 127.6 cm/sec  AV Harish Ratio (DI): 0.50  OMID Index (cm2/m2): 0.94  E/E' av.6  Lateral E/e': 4.8  Medial E/e': 8.3    Assessment and Plan:     We discussed the results with patient.  We discussed the importance of a heart healthy diet and lifestyle.  We continue with same medical regimen.  Follow-up within 1 yr.    Jerry Smallwood MD, PhD  Professor of Medicine  Division of Cardiology          CC  Patient Care Team:  Hay Melendez as PCP - General (Internal Medicine)  Jerry Smallwood MD as MD (Cardiovascular Disease)  Jerry Smallwood MD as Assigned Heart and Vascular Provider  Giselle Blake RN as Specialty Care Coordinator (Cardiology)  SELF, REFERRED

## 2022-08-15 ENCOUNTER — TRANSFERRED RECORDS (OUTPATIENT)
Dept: HEALTH INFORMATION MANAGEMENT | Facility: CLINIC | Age: 62
End: 2022-08-15

## 2022-09-10 ENCOUNTER — HEALTH MAINTENANCE LETTER (OUTPATIENT)
Age: 62
End: 2022-09-10

## 2022-09-30 ENCOUNTER — TELEPHONE (OUTPATIENT)
Dept: CARDIOLOGY | Facility: CLINIC | Age: 62
End: 2022-09-30

## 2022-09-30 NOTE — TELEPHONE ENCOUNTER
Called patient to review recent elevated blood pressure reading.  Per [place], patients blood pressure is out of parameters and recheck blood pressure is recommended.    Last Blood Pressure: 162/80  Last Heart Rate:   Date: 7/28  Location: [facility]    Today's Blood Pressure: 147/80  Today's Heart Rate: 59  Location: Home BP    Patient reported blood pressure updated in Epic. Blood pressure continues to fall outside of the [place] Community [place] guidelines.  Message sent to primary cardiology team for further review.      Magnus Frost EMT September 30, 2022

## 2022-10-26 ENCOUNTER — TRANSFERRED RECORDS (OUTPATIENT)
Dept: HEALTH INFORMATION MANAGEMENT | Facility: CLINIC | Age: 62
End: 2022-10-26

## 2022-10-27 ENCOUNTER — MYC MEDICAL ADVICE (OUTPATIENT)
Dept: CARDIOLOGY | Facility: CLINIC | Age: 62
End: 2022-10-27

## 2022-10-31 DIAGNOSIS — R55 SYNCOPE, UNSPECIFIED SYNCOPE TYPE: ICD-10-CM

## 2022-10-31 DIAGNOSIS — I10 ESSENTIAL HYPERTENSION: ICD-10-CM

## 2022-10-31 RX ORDER — AMLODIPINE BESYLATE 5 MG/1
5 TABLET ORAL DAILY
Qty: 90 TABLET | Refills: 3 | Status: SHIPPED | OUTPATIENT
Start: 2022-10-31 | End: 2023-10-20

## 2022-10-31 RX ORDER — LOSARTAN POTASSIUM AND HYDROCHLOROTHIAZIDE 12.5; 5 MG/1; MG/1
1 TABLET ORAL DAILY
Qty: 90 TABLET | Refills: 3 | Status: SHIPPED | OUTPATIENT
Start: 2022-10-31 | End: 2023-10-20

## 2022-11-18 ENCOUNTER — LAB (OUTPATIENT)
Dept: LAB | Facility: CLINIC | Age: 62
End: 2022-11-18
Payer: COMMERCIAL

## 2022-11-18 DIAGNOSIS — I10 ESSENTIAL HYPERTENSION: ICD-10-CM

## 2022-11-18 LAB
ANION GAP SERPL CALCULATED.3IONS-SCNC: 8 MMOL/L (ref 7–15)
BUN SERPL-MCNC: 15 MG/DL (ref 8–23)
CALCIUM SERPL-MCNC: 9.5 MG/DL (ref 8.8–10.2)
CHLORIDE SERPL-SCNC: 101 MMOL/L (ref 98–107)
CREAT SERPL-MCNC: 1.07 MG/DL (ref 0.67–1.17)
DEPRECATED HCO3 PLAS-SCNC: 29 MMOL/L (ref 22–29)
GFR SERPL CREATININE-BSD FRML MDRD: 78 ML/MIN/1.73M2
GLUCOSE SERPL-MCNC: 98 MG/DL (ref 70–99)
POTASSIUM SERPL-SCNC: 4.7 MMOL/L (ref 3.4–5.3)
SODIUM SERPL-SCNC: 138 MMOL/L (ref 136–145)

## 2022-11-18 PROCEDURE — 80048 BASIC METABOLIC PNL TOTAL CA: CPT | Performed by: PATHOLOGY

## 2022-11-18 PROCEDURE — 36415 COLL VENOUS BLD VENIPUNCTURE: CPT | Performed by: PATHOLOGY

## 2022-12-06 ENCOUNTER — TRANSFERRED RECORDS (OUTPATIENT)
Dept: HEALTH INFORMATION MANAGEMENT | Facility: CLINIC | Age: 62
End: 2022-12-06

## 2022-12-15 ENCOUNTER — TRANSFERRED RECORDS (OUTPATIENT)
Dept: HEALTH INFORMATION MANAGEMENT | Facility: CLINIC | Age: 62
End: 2022-12-15

## 2023-01-17 ENCOUNTER — TRANSFERRED RECORDS (OUTPATIENT)
Dept: HEALTH INFORMATION MANAGEMENT | Facility: CLINIC | Age: 63
End: 2023-01-17

## 2023-03-23 DIAGNOSIS — E78.5 HYPERLIPIDEMIA LDL GOAL <100: ICD-10-CM

## 2023-03-24 NOTE — TELEPHONE ENCOUNTER
ezetimibe (ZETIA) 10 MG   Last Written Prescription Date:  1/27/22  Last Fill Quantity: 90,   # refills: 3  Last Office Visit : 7/28/22  Future Office visit:  NONE  Routing refill request to provider for review/approval because:  Drug not on the refill protocol

## 2023-03-28 DIAGNOSIS — E78.5 HYPERLIPIDEMIA LDL GOAL <100: ICD-10-CM

## 2023-03-28 RX ORDER — EZETIMIBE 10 MG/1
10 TABLET ORAL DAILY
Qty: 90 TABLET | Refills: 3 | OUTPATIENT
Start: 2023-03-28

## 2023-03-28 RX ORDER — EZETIMIBE 10 MG/1
10 TABLET ORAL DAILY
Qty: 90 TABLET | Refills: 1 | Status: SHIPPED | OUTPATIENT
Start: 2023-03-28 | End: 2023-09-20

## 2023-05-02 ENCOUNTER — TRANSFERRED RECORDS (OUTPATIENT)
Dept: HEALTH INFORMATION MANAGEMENT | Facility: CLINIC | Age: 63
End: 2023-05-02
Payer: COMMERCIAL

## 2023-05-19 ENCOUNTER — MYC MEDICAL ADVICE (OUTPATIENT)
Dept: CARDIOLOGY | Facility: CLINIC | Age: 63
End: 2023-05-19
Payer: COMMERCIAL

## 2023-05-22 NOTE — TELEPHONE ENCOUNTER
Called patient to review recent elevated blood pressure reading.  Per [place], patients blood pressure is out of parameters and recheck blood pressure is recommended.    Last Blood Pressure: 162/80   Date: 7/28/2022    Today's Blood Pressure: 135/79  Location: Home BP    Patient reported blood pressure updated in Epic. Blood pressure falls within [place] Community Me[place] guidelines.  Patient will follow up as previously advised.

## 2023-06-14 DIAGNOSIS — G89.29 CHRONIC BILATERAL LOW BACK PAIN WITH SCIATICA, SCIATICA LATERALITY UNSPECIFIED: Primary | ICD-10-CM

## 2023-06-14 DIAGNOSIS — M54.40 CHRONIC BILATERAL LOW BACK PAIN WITH SCIATICA, SCIATICA LATERALITY UNSPECIFIED: Primary | ICD-10-CM

## 2023-06-14 RX ORDER — FLURBIPROFEN 100 MG
100 TABLET ORAL 3 TIMES DAILY PRN
Qty: 90 TABLET | Refills: 3 | Status: SHIPPED | OUTPATIENT
Start: 2023-06-14

## 2023-06-15 ENCOUNTER — TRANSFERRED RECORDS (OUTPATIENT)
Dept: HEALTH INFORMATION MANAGEMENT | Facility: CLINIC | Age: 63
End: 2023-06-15
Payer: COMMERCIAL

## 2023-06-23 DIAGNOSIS — E78.5 HYPERLIPIDEMIA LDL GOAL <100: Primary | ICD-10-CM

## 2023-06-26 ENCOUNTER — OFFICE VISIT (OUTPATIENT)
Dept: CARDIOLOGY | Facility: CLINIC | Age: 63
End: 2023-06-26
Attending: INTERNAL MEDICINE
Payer: COMMERCIAL

## 2023-06-26 ENCOUNTER — LAB (OUTPATIENT)
Dept: LAB | Facility: CLINIC | Age: 63
End: 2023-06-26
Payer: COMMERCIAL

## 2023-06-26 VITALS
SYSTOLIC BLOOD PRESSURE: 150 MMHG | HEART RATE: 59 BPM | HEIGHT: 71 IN | BODY MASS INDEX: 29.99 KG/M2 | WEIGHT: 214.2 LBS | OXYGEN SATURATION: 98 % | DIASTOLIC BLOOD PRESSURE: 80 MMHG

## 2023-06-26 DIAGNOSIS — I35.0 AORTIC VALVE STENOSIS, ETIOLOGY OF CARDIAC VALVE DISEASE UNSPECIFIED: Primary | ICD-10-CM

## 2023-06-26 DIAGNOSIS — E78.5 HYPERLIPIDEMIA LDL GOAL <100: ICD-10-CM

## 2023-06-26 DIAGNOSIS — R55 SYNCOPE, UNSPECIFIED SYNCOPE TYPE: ICD-10-CM

## 2023-06-26 LAB
ALBUMIN SERPL BCG-MCNC: 4.3 G/DL (ref 3.5–5.2)
ALP SERPL-CCNC: 60 U/L (ref 40–129)
ALT SERPL W P-5'-P-CCNC: 33 U/L (ref 0–70)
ANION GAP SERPL CALCULATED.3IONS-SCNC: 8 MMOL/L (ref 7–15)
AST SERPL W P-5'-P-CCNC: 25 U/L (ref 0–45)
BILIRUB SERPL-MCNC: 0.7 MG/DL
BUN SERPL-MCNC: 17.5 MG/DL (ref 8–23)
CALCIUM SERPL-MCNC: 9.5 MG/DL (ref 8.8–10.2)
CHLORIDE SERPL-SCNC: 103 MMOL/L (ref 98–107)
CHOLEST SERPL-MCNC: 138 MG/DL
CREAT SERPL-MCNC: 1.02 MG/DL (ref 0.67–1.17)
DEPRECATED HCO3 PLAS-SCNC: 27 MMOL/L (ref 22–29)
GFR SERPL CREATININE-BSD FRML MDRD: 83 ML/MIN/1.73M2
GLUCOSE SERPL-MCNC: 96 MG/DL (ref 70–99)
HDLC SERPL-MCNC: 42 MG/DL
LDLC SERPL CALC-MCNC: 78 MG/DL
LDLC SERPL DIRECT ASSAY-MCNC: 85 MG/DL
NONHDLC SERPL-MCNC: 96 MG/DL
POTASSIUM SERPL-SCNC: 3.9 MMOL/L (ref 3.4–5.3)
PROT SERPL-MCNC: 7.5 G/DL (ref 6.4–8.3)
SODIUM SERPL-SCNC: 138 MMOL/L (ref 136–145)
TRIGL SERPL-MCNC: 89 MG/DL

## 2023-06-26 PROCEDURE — 80061 LIPID PANEL: CPT | Performed by: PATHOLOGY

## 2023-06-26 PROCEDURE — 80053 COMPREHEN METABOLIC PANEL: CPT | Performed by: PATHOLOGY

## 2023-06-26 PROCEDURE — 99000 SPECIMEN HANDLING OFFICE-LAB: CPT | Performed by: PATHOLOGY

## 2023-06-26 PROCEDURE — 99214 OFFICE O/P EST MOD 30 MIN: CPT | Performed by: INTERNAL MEDICINE

## 2023-06-26 PROCEDURE — 83721 ASSAY OF BLOOD LIPOPROTEIN: CPT | Performed by: INTERNAL MEDICINE

## 2023-06-26 PROCEDURE — G0463 HOSPITAL OUTPT CLINIC VISIT: HCPCS | Performed by: INTERNAL MEDICINE

## 2023-06-26 PROCEDURE — 36415 COLL VENOUS BLD VENIPUNCTURE: CPT | Performed by: PATHOLOGY

## 2023-06-26 ASSESSMENT — PAIN SCALES - GENERAL: PAINLEVEL: NO PAIN (0)

## 2023-06-26 NOTE — NURSING NOTE
June 26, 2023    Medication Changes: None      Follow Up:   -Echocardiogram when able  -Follow up with Dr. Smallwood in 1 year with fasting labs prior to visit    Patient verbalized understanding of all health information given and agreed to call with further questions or concerns.      Giselle Blake RN

## 2023-06-26 NOTE — PATIENT INSTRUCTIONS
June 26, 2023    Cardiology Provider You Saw During Your Visit: Dr. Smallwood      Medication Changes: None      Follow Up:   -Echocardiogram when able  -Follow up with Dr. Smallwood in 1 year with fasting labs prior to visit      Follow the American Heart Association Diet and Lifestyle Recommendations:  -Limit saturated fat, trans fat, sodium, red meat, sweets and sugar-sweetened beverages. If you choose to eat red meat, compare labels and select the leanest cuts available.  -Aim for at least 150 minutes of moderate physical activity or 75 minutes of vigorous physical activity - or an equal combination of both - each week.      To Reach Us:  -During business hours: [phone], press option # 1 to schedule an appointment or to leave a message for your care team.     -After hours, weekends or holidays: [phone], press option #4 and ask to speak to the on-call cardiologist. Inform them you are a patient at the [facility].        **If you have a cardiac device, please make sure you schedule an in-person device check just prior to your cardiology provider appointments**        Giselle Blake RN  Cardiology Care Coordinator - General Cardiology  [facility]

## 2023-06-26 NOTE — PROGRESS NOTES
HPI:     I had the privilege to evaluate and examine Mr Yasir Hines, who is a 63 yr male patient with a Hx of hypercholesterolemia, hypertension who has been very active and complaints about palpitations and rest and during effort   On 07-22-21 patient developed a syncope and was seen at ER in [place] - (results in care everywhere and got ten a cardiovascular work up in [facility] - see echocardiogram/labs).     During last encounter, we initiated amlodipine 5mg at bedtime, losartan 100mg once daily, aspirin 81mg once daily.  We also obtained a CT CAC, which showed a CAC score of 319 placing patient in 83rd percentile for risk of developing CAD.  We recommended continuing atorvastatin 80 mg and ezetimibe 10 mg daily.  Goal LDL cholesterol less than 70.  Recent LDL cholesterol showed 67 mg/dL.     Patient denies chest patient, shortness of breath and intermittent claudication.  Continues to be physically active without symptoms.  Reports no myalgias/muscle aches.       His BP is well controlled.       PAST MEDICAL HISTORY:  Past Medical History:   Diagnosis Date     Hypertension        CURRENT MEDICATIONS:  Current Outpatient Medications   Medication Sig Dispense Refill     amLODIPine (NORVASC) 5 MG tablet Take 1 tablet (5 mg) by mouth daily 90 tablet 3     atorvastatin (LIPITOR) 40 MG tablet Take 1 tablet (40 mg) by mouth daily 90 tablet 3     ezetimibe (ZETIA) 10 MG tablet Take 1 tablet (10 mg) by mouth daily 90 tablet 1     flurbiprofen (ANSAID) 100 MG tablet Take 1 tablet (100 mg) by mouth 3 times daily as needed (moderate pain) 90 tablet 3     gabapentin (NEURONTIN) 300 MG capsule Take 1 capsule (300 mg) by mouth 3 times daily 270 capsule 3     Glucosamine Sulfate 1000 MG TABS        losartan-hydrochlorothiazide (HYZAAR) 50-12.5 MG tablet Take 1 tablet by mouth daily 90 tablet 3     meloxicam (MOBIC) 7.5 MG tablet Take 2 tablets (15 mg) by mouth daily 60 tablet 1     omega 3 1000 MG CAPS        aspirin (ASPIRIN  "LOW DOSE) 81 MG EC tablet Take 1 tablet (81 mg) by mouth daily (Patient not taking: Reported on 6/26/2023) 90 tablet 2     cyclobenzaprine (FLEXERIL) 5 MG tablet Take 1-2 tablets (5-10 mg) by mouth 3 times daily as needed for muscle spasms (Patient not taking: Reported on 7/28/2022) 90 tablet 1     tiZANidine (ZANAFLEX) 2 MG tablet Take 1 tablet (2 mg) by mouth 3 times daily as needed for muscle spasms (Patient not taking: Reported on 1/27/2022) 30 tablet 0       PAST SURGICAL HISTORY:  Past Surgical History:   Procedure Laterality Date     INJECT EPIDURAL LUMBAR / SACRAL SINGLE N/A 11/23/2021    Procedure: Lumbar 4 5 Interlaminal Epidural Steroid Injection @;  Surgeon: Vaibhav Rucker MD;  Location: [facility] OR       ALLERGIES   No Known Allergies    FAMILY HISTORY:  No family history on file.    SOCIAL HISTORY:  Social History     Socioeconomic History     Marital status:      Spouse name: None     Number of children: None     Years of education: None     Highest education level: None   Tobacco Use     Smoking status: Never     Smokeless tobacco: Never   Substance and Sexual Activity     Alcohol use: Yes     Comment: occasionally     Drug use: Never       ROS:   Constitutional: No fever, chills, or sweats. No weight gain/loss   ENT: No visual disturbance, ear ache, epistaxis, sore throat  Allergies/Immunologic: Negative.   Respiratory: No cough, hemoptysia  Cardiovascular: As per HPI  GI: No nausea, vomiting, hematemesis, melena, or hematochezia  : No urinary frequency, dysuria, or hematuria  Integument: Negative  Psychiatric: Negative  Neuro: Negative  Endocrinology: Negative   Musculoskeletal: Negative    EXAM:  BP (!) 150/80 (BP Location: Right arm, Patient Position: Sitting, Cuff Size: Adult Regular)   Pulse 59   Ht 1.803 m (5' 10.98\")   Wt 97.2 kg (214 lb 3.2 oz)   SpO2 98%   BMI 29.89 kg/m    In general, the patient is a pleasant male in no apparent distress.    HEENT: NC/AT.  VENKAT OSMAN.  " Sclerae white, not injected.  Nares clear.  Pharynx without erythema or exudate.  Dentition intact.    Neck: No adenopathy.  No thyromegaly. Carotids +4/4 bilaterally without bruits.  No jugular venous distension.   Heart: RRR. Normal S1, S2 splits physiologically. No murmur, rub, click, or gallop. The PMI is in the 5th ICS in the midclavicular line. There is no heave.    Lungs: CTA.  No ronchi, wheezes, rales.  No dullness to percussion.   Abdomen: Soft, nontender, nondistended. No organomegaly.  No bruits.   Extremities: No clubbing, cyanosis, or edema.  The pulses are +4/4 at the radial, brachial, femoral, popliteal, DP, and PT sites bilaterally.  No bruits are noted.  Neurologic: Alert and oriented to person/place/time, normal speech, gait and affect  Skin: No petechiae, purpura or rash.    BP at the end of the visit 134/78 - home BP measurement are all within the normal range     Labs:  LIPID RESULTS:  Lab Results   Component Value Date    CHOL 138 06/26/2023    HDL 42 06/26/2023    LDL 85 06/26/2023    LDL 78 06/26/2023    TRIG 89 06/26/2023    NHDL 96 06/26/2023       LIVER ENZYME RESULTS:  Lab Results   Component Value Date    AST 25 06/26/2023    ALT 33 06/26/2023         BMP RESULTS:  Lab Results   Component Value Date     06/26/2023    POTASSIUM 3.9 06/26/2023    POTASSIUM 4.3 07/27/2022    CHLORIDE 103 06/26/2023    CHLORIDE 108 07/27/2022    CO2 27 06/26/2023    CO2 28 07/27/2022    ANIONGAP 8 06/26/2023    ANIONGAP <1 (L) 07/27/2022    GLC 96 06/26/2023    GLC 98 07/27/2022    BUN 17.5 06/26/2023    BUN 19 07/27/2022    CR 1.02 06/26/2023    GFRESTIMATED 83 06/26/2023    PAMELA 9.5 06/26/2023          Procedures:    Echocardiogram 06-30-23    Left ventricular size, wall motion and function are normal. The ejection  fraction is 55-60%.  Right ventricular function, chamber size, wall motion, and thickness are  normal.  No aortic stenosis is present.  IVC diameter <2.1 cm collapsing >50% with sniff  suggests a normal RA pressure  of 3 mmHg.  No pericardial effusion is present.  There has been no change.     ______________________________________________________________________________  Left Ventricle  Left ventricular size, wall motion and function are normal. The ejection  fraction is 55-60%. Left ventricular wall thickness is normal. Left  ventricular diastolic function is normal. No regional wall motion  abnormalities are seen.     Right Ventricle  Right ventricular function, chamber size, wall motion, and thickness are  normal.     Atria  The right atria appears normal. Moderate left atrial enlargement is present.  The atrial septum is intact as assessed by color Doppler .     Mitral Valve  The mitral valve is normal. Mild mitral insufficiency is present.     Aortic Valve  Trileaflet aortic sclerosis without stenosis. No aortic stenosis is present.  The peak aortic velocity is 2.47 m/sec. The mean gradient across the aortic  valve is 12 mmHg.     Tricuspid Valve  The tricuspid valve is normal. Mild tricuspid insufficiency is present.     Pulmonic Valve  The pulmonic valve cannot be assessed. On Doppler interrogation, there is no  significant stenosis or regurgitation.     Vessels  The aorta root is normal. The thoracic aorta is normal. The pulmonary artery  and bifurcation cannot be assessed. IVC diameter <2.1 cm collapsing >50% with  sniff suggests a normal RA pressure of 3 mmHg.     Pericardium  No pericardial effusion is present.     Compared to Previous Study  There has been no change.     Attestation  I have personally viewed the imaging and agree with the interpretation and  report as documented by the fellow, Santana Hawk, and/or edited by me.  ______________________________________________________________________________  MMode/2D Measurements & Calculations  IVSd: 0.83 cm  LVIDd: 4.8 cm  LVIDs: 3.3 cm  LVPWd: 0.85 cm  FS: 31.3 %  LV mass(C)d: 133.9 grams  LV mass(C)dI: 61.7 grams/m2  Ao root diam:  3.3 cm  asc Aorta Diam: 3.3 cm  LVOT diam: 2.1 cm  LVOT area: 3.5 cm2  LA Volume (BP): 78.1 ml     LA Volume Index (BP): 36.0 ml/m2  RWT: 0.35  TAPSE: 2.3 cm     Doppler Measurements & Calculations  MV E max harish: 54.5 cm/sec  MV A max harish: 44.0 cm/sec  MV E/A: 1.2  MV dec slope: 135.0 cm/sec2  MV dec time: 0.41 sec  Ao V2 max: 247.0 cm/sec  Ao max P.4 mmHg  Ao V2 mean: 151.3 cm/sec  Ao mean P.0 mmHg  Ao V2 VTI: 48.7 cm  OMID(I,D): 1.7 cm2  OMID(V,D): 1.5 cm2  LV V1 max P.3 mmHg  LV V1 max: 104.0 cm/sec  LV V1 VTI: 23.2 cm  SV(LVOT): 80.4 ml  SI(LVOT): 37.0 ml/m2  TR max harish: 239.0 cm/sec  TR max P.8 mmHg  RVSP(TR): 25.8 mmHg  AV Harish Ratio (DI): 0.42  OMID Index (cm2/m2): 0.76  E/E' av.7  Lateral E/e': 4.4     Medial E/e': 7.0  RV S Harish: 11.4 cm/sec       Assessment and Plan:     We discussed the results with patient.  We discussed the importance of a heart healthy diet and lifestyle.    We discussed following items:       Medication Changes: None        Follow Up:   -Echocardiogram when able  -Follow up with Dr. Lee in 1 year with fasting labs prior to visit        Follow the American Heart Association Diet and Lifestyle Recommendations:  -Limit saturated fat, trans fat, sodium, red meat, sweets and sugar-sweetened beverages. If you choose to eat red meat, compare labels and select the leanest cuts available.  -Aim for at least 150 minutes of moderate physical activity or 75 minutes of vigorous physical activity - or an equal combination of both - each week.      Jerry Lee MD, PhD  Professor of Medicine  Division of Cardiology      CC  Patient Care Team:  Hay Melendez as PCP - General (Internal Medicine)  Jerry Lee MD as MD (Cardiovascular Disease)  Jerry Lee MD as Assigned Heart and Vascular Provider  Giselle lBake RN as Specialty Care Coordinator (Cardiology)  JERRY LEE

## 2023-06-26 NOTE — NURSING NOTE
Chief Complaint   Patient presents with     Follow Up     Cl follow up       Vitals were taken, medications reconciled.    Liset Kaiser, EMT   4:30 PM

## 2023-06-26 NOTE — LETTER
6/26/2023      RE: Yasir Hines  [address] [facility] [id number]       Dear Colleague,    Thank you for the opportunity to participate in the care of your patient, Yasir Hines, at the [facility] [place] at [facility]. Please see a copy of my visit note below.    HPI:     I had the privilege to evaluate and examine Mr Yasir Hines, who is a 63 yr male patient with a Hx of hypercholesterolemia, hypertension who has been very active and complaints about palpitations and rest and during effort   On 07-22-21 patient developed a syncope and was seen at ER in [place] - (results in care everywhere and got ten a cardiovascular work up in [facility] - see echocardiogram/labs).     During last encounter, we initiated amlodipine 5mg at bedtime, losartan 100mg once daily, aspirin 81mg once daily.  We also obtained a CT CAC, which showed a CAC score of 319 placing patient in 83rd percentile for risk of developing CAD.  We recommended continuing atorvastatin 80 mg and ezetimibe 10 mg daily.  Goal LDL cholesterol less than 70.  Recent LDL cholesterol showed 67 mg/dL.     Patient denies chest patient, shortness of breath and intermittent claudication.  Continues to be physically active without symptoms.  Reports no myalgias/muscle aches.       His BP is well controlled.       PAST MEDICAL HISTORY:  Past Medical History:   Diagnosis Date     Hypertension        CURRENT MEDICATIONS:  Current Outpatient Medications   Medication Sig Dispense Refill     amLODIPine (NORVASC) 5 MG tablet Take 1 tablet (5 mg) by mouth daily 90 tablet 3     atorvastatin (LIPITOR) 40 MG tablet Take 1 tablet (40 mg) by mouth daily 90 tablet 3     ezetimibe (ZETIA) 10 MG tablet Take 1 tablet (10 mg) by mouth daily 90 tablet 1     flurbiprofen (ANSAID) 100 MG tablet Take 1 tablet (100 mg) by mouth 3 times daily as needed (moderate pain) 90 tablet 3     gabapentin (NEURONTIN)  300 MG capsule Take 1 capsule (300 mg) by mouth 3 times daily 270 capsule 3     Glucosamine Sulfate 1000 MG TABS        losartan-hydrochlorothiazide (HYZAAR) 50-12.5 MG tablet Take 1 tablet by mouth daily 90 tablet 3     meloxicam (MOBIC) 7.5 MG tablet Take 2 tablets (15 mg) by mouth daily 60 tablet 1     omega 3 1000 MG CAPS        aspirin (ASPIRIN LOW DOSE) 81 MG EC tablet Take 1 tablet (81 mg) by mouth daily (Patient not taking: Reported on 6/26/2023) 90 tablet 2     cyclobenzaprine (FLEXERIL) 5 MG tablet Take 1-2 tablets (5-10 mg) by mouth 3 times daily as needed for muscle spasms (Patient not taking: Reported on 7/28/2022) 90 tablet 1     tiZANidine (ZANAFLEX) 2 MG tablet Take 1 tablet (2 mg) by mouth 3 times daily as needed for muscle spasms (Patient not taking: Reported on 1/27/2022) 30 tablet 0       PAST SURGICAL HISTORY:  Past Surgical History:   Procedure Laterality Date     INJECT EPIDURAL LUMBAR / SACRAL SINGLE N/A 11/23/2021    Procedure: Lumbar 4 5 Interlaminal Epidural Steroid Injection @;  Surgeon: Vaibhav Rucker MD;  Location: [facility] OR       ALLERGIES   No Known Allergies    FAMILY HISTORY:  No family history on file.    SOCIAL HISTORY:  Social History     Socioeconomic History     Marital status:      Spouse name: None     Number of children: None     Years of education: None     Highest education level: None   Tobacco Use     Smoking status: Never     Smokeless tobacco: Never   Substance and Sexual Activity     Alcohol use: Yes     Comment: occasionally     Drug use: Never       ROS:   Constitutional: No fever, chills, or sweats. No weight gain/loss   ENT: No visual disturbance, ear ache, epistaxis, sore throat  Allergies/Immunologic: Negative.   Respiratory: No cough, hemoptysia  Cardiovascular: As per HPI  GI: No nausea, vomiting, hematemesis, melena, or hematochezia  : No urinary frequency, dysuria, or hematuria  Integument: Negative  Psychiatric: Negative  Neuro:  "Negative  Endocrinology: Negative   Musculoskeletal: Negative    EXAM:  BP (!) 150/80 (BP Location: Right arm, Patient Position: Sitting, Cuff Size: Adult Regular)   Pulse 59   Ht 1.803 m (5' 10.98\")   Wt 97.2 kg (214 lb 3.2 oz)   SpO2 98%   BMI 29.89 kg/m    In general, the patient is a pleasant male in no apparent distress.    HEENT: NC/AT.  PERRLA.  EOMI.  Sclerae white, not injected.  Nares clear.  Pharynx without erythema or exudate.  Dentition intact.    Neck: No adenopathy.  No thyromegaly. Carotids +4/4 bilaterally without bruits.  No jugular venous distension.   Heart: RRR. Normal S1, S2 splits physiologically. No murmur, rub, click, or gallop. The PMI is in the 5th ICS in the midclavicular line. There is no heave.    Lungs: CTA.  No ronchi, wheezes, rales.  No dullness to percussion.   Abdomen: Soft, nontender, nondistended. No organomegaly.  No bruits.   Extremities: No clubbing, cyanosis, or edema.  The pulses are +4/4 at the radial, brachial, femoral, popliteal, DP, and PT sites bilaterally.  No bruits are noted.  Neurologic: Alert and oriented to person/place/time, normal speech, gait and affect  Skin: No petechiae, purpura or rash.    BP at the end of the visit 134/78 - home BP measurement are all within the normal range     Labs:  LIPID RESULTS:  Lab Results   Component Value Date    CHOL 138 06/26/2023    HDL 42 06/26/2023    LDL 85 06/26/2023    LDL 78 06/26/2023    TRIG 89 06/26/2023    NHDL 96 06/26/2023       LIVER ENZYME RESULTS:  Lab Results   Component Value Date    AST 25 06/26/2023    ALT 33 06/26/2023         BMP RESULTS:  Lab Results   Component Value Date     06/26/2023    POTASSIUM 3.9 06/26/2023    POTASSIUM 4.3 07/27/2022    CHLORIDE 103 06/26/2023    CHLORIDE 108 07/27/2022    CO2 27 06/26/2023    CO2 28 07/27/2022    ANIONGAP 8 06/26/2023    ANIONGAP <1 (L) 07/27/2022    GLC 96 06/26/2023    GLC 98 07/27/2022    BUN 17.5 06/26/2023    BUN 19 07/27/2022    CR 1.02 " 06/26/2023    GFRESTIMATED 83 06/26/2023    PAMELA 9.5 06/26/2023          Procedures:    Echocardiogram 06-30-23    Left ventricular size, wall motion and function are normal. The ejection  fraction is 55-60%.  Right ventricular function, chamber size, wall motion, and thickness are  normal.  No aortic stenosis is present.  IVC diameter <2.1 cm collapsing >50% with sniff suggests a normal RA pressure  of 3 mmHg.  No pericardial effusion is present.  There has been no change.     ______________________________________________________________________________  Left Ventricle  Left ventricular size, wall motion and function are normal. The ejection  fraction is 55-60%. Left ventricular wall thickness is normal. Left  ventricular diastolic function is normal. No regional wall motion  abnormalities are seen.     Right Ventricle  Right ventricular function, chamber size, wall motion, and thickness are  normal.     Atria  The right atria appears normal. Moderate left atrial enlargement is present.  The atrial septum is intact as assessed by color Doppler .     Mitral Valve  The mitral valve is normal. Mild mitral insufficiency is present.     Aortic Valve  Trileaflet aortic sclerosis without stenosis. No aortic stenosis is present.  The peak aortic velocity is 2.47 m/sec. The mean gradient across the aortic  valve is 12 mmHg.     Tricuspid Valve  The tricuspid valve is normal. Mild tricuspid insufficiency is present.     Pulmonic Valve  The pulmonic valve cannot be assessed. On Doppler interrogation, there is no  significant stenosis or regurgitation.     Vessels  The aorta root is normal. The thoracic aorta is normal. The pulmonary artery  and bifurcation cannot be assessed. IVC diameter <2.1 cm collapsing >50% with  sniff suggests a normal RA pressure of 3 mmHg.     Pericardium  No pericardial effusion is present.     Compared to Previous Study  There has been no change.     Attestation  I have personally viewed the imaging  and agree with the interpretation and  report as documented by the fellow, Santana Hawk, and/or edited by me.  ______________________________________________________________________________  MMode/2D Measurements & Calculations  IVSd: 0.83 cm  LVIDd: 4.8 cm  LVIDs: 3.3 cm  LVPWd: 0.85 cm  FS: 31.3 %  LV mass(C)d: 133.9 grams  LV mass(C)dI: 61.7 grams/m2  Ao root diam: 3.3 cm  asc Aorta Diam: 3.3 cm  LVOT diam: 2.1 cm  LVOT area: 3.5 cm2  LA Volume (BP): 78.1 ml     LA Volume Index (BP): 36.0 ml/m2  RWT: 0.35  TAPSE: 2.3 cm     Doppler Measurements & Calculations  MV E max harish: 54.5 cm/sec  MV A max harish: 44.0 cm/sec  MV E/A: 1.2  MV dec slope: 135.0 cm/sec2  MV dec time: 0.41 sec  Ao V2 max: 247.0 cm/sec  Ao max P.4 mmHg  Ao V2 mean: 151.3 cm/sec  Ao mean P.0 mmHg  Ao V2 VTI: 48.7 cm  OMID(I,D): 1.7 cm2  OMID(V,D): 1.5 cm2  LV V1 max P.3 mmHg  LV V1 max: 104.0 cm/sec  LV V1 VTI: 23.2 cm  SV(LVOT): 80.4 ml  SI(LVOT): 37.0 ml/m2  TR max harish: 239.0 cm/sec  TR max P.8 mmHg  RVSP(TR): 25.8 mmHg  AV Harish Ratio (DI): 0.42  OMID Index (cm2/m2): 0.76  E/E' av.7  Lateral E/e': 4.4     Medial E/e': 7.0  RV S Harish: 11.4 cm/sec       Assessment and Plan:     We discussed the results with patient.  We discussed the importance of a heart healthy diet and lifestyle.    We discussed following items:       Medication Changes: None        Follow Up:   -Echocardiogram when able  -Follow up with Dr. Smallwood in 1 year with fasting labs prior to visit        Follow the American Heart Association Diet and Lifestyle Recommendations:  -Limit saturated fat, trans fat, sodium, red meat, sweets and sugar-sweetened beverages. If you choose to eat red meat, compare labels and select the leanest cuts available.  -Aim for at least 150 minutes of moderate physical activity or 75 minutes of vigorous physical activity - or an equal combination of both - each week.      Jerry Smallwood MD, PhD  Professor of Medicine  Division of  Cardiology      CC  Patient Care Team:  Hay Melendez as PCP - General (Internal Medicine)  Efren Lee MD as MD (Cardiovascular Disease)  Efren Lee MD as Assigned Heart and Vascular Provider  Giselle Blake RN as Specialty Care Coordinator (Cardiology)  EFREN LEE

## 2023-06-30 ENCOUNTER — HOSPITAL ENCOUNTER (OUTPATIENT)
Dept: CARDIOLOGY | Facility: CLINIC | Age: 63
Discharge: HOME | End: 2023-06-30
Attending: INTERNAL MEDICINE | Admitting: INTERNAL MEDICINE
Payer: COMMERCIAL

## 2023-06-30 DIAGNOSIS — I35.0 AORTIC VALVE STENOSIS, ETIOLOGY OF CARDIAC VALVE DISEASE UNSPECIFIED: ICD-10-CM

## 2023-06-30 LAB — LVEF ECHO: NORMAL

## 2023-06-30 PROCEDURE — 93306 TTE W/DOPPLER COMPLETE: CPT | Mod: GC | Performed by: INTERNAL MEDICINE

## 2023-06-30 PROCEDURE — 93306 TTE W/DOPPLER COMPLETE: CPT

## 2023-09-16 DIAGNOSIS — E78.5 HYPERLIPIDEMIA LDL GOAL <100: ICD-10-CM

## 2023-09-20 RX ORDER — EZETIMIBE 10 MG/1
10 TABLET ORAL DAILY
Qty: 90 TABLET | Refills: 2 | Status: SHIPPED | OUTPATIENT
Start: 2023-09-20 | End: 2024-06-19

## 2023-10-18 DIAGNOSIS — R55 SYNCOPE, UNSPECIFIED SYNCOPE TYPE: ICD-10-CM

## 2023-10-18 DIAGNOSIS — I10 ESSENTIAL HYPERTENSION: ICD-10-CM

## 2023-10-20 RX ORDER — LOSARTAN POTASSIUM AND HYDROCHLOROTHIAZIDE 12.5; 5 MG/1; MG/1
1 TABLET ORAL DAILY
Qty: 90 TABLET | Refills: 3 | Status: SHIPPED | OUTPATIENT
Start: 2023-10-20

## 2023-10-20 RX ORDER — AMLODIPINE BESYLATE 5 MG/1
5 TABLET ORAL DAILY
Qty: 90 TABLET | Refills: 3 | Status: SHIPPED | OUTPATIENT
Start: 2023-10-20

## 2023-10-20 NOTE — TELEPHONE ENCOUNTER
LOSARTAN-HCTZ 50-12.5 MG TAB   Last Written Prescription Date:  10/31/2022  Last Fill Quantity: 90,   # refills: 3  Last Office Visit :  6/26/2023  Future Office visit:  None  Routing refill request to provider for review/approval because:  Abnormal B/P  Change med?  Change dose?  Add med?  Follow up B/P check?  Refer to clinic for review       BP Readings from Last 3 Encounters:   06/26/23 (!) 150/80   07/28/22 (!) 162/80   01/27/22 (!) 160/76         Angiotensin-II Receptors Hbceyd08/18/2023 01:58 AM   Protocol Details Last blood pressure under 140/90 in past 12 months        AMLODIPINE BESYLATE 5 MG TAB   Last Written Prescription Date:  10/31/2022  Last Fill Quantity: 90,   # refills: 3  Last Office Visit :  6/26/2023  Future Office visit:  None  Routing refill request to provider for review/approval because:  Abnormal B/P  Change med?  Change dose?  Add med?  Follow up B/P check?  Refer to clinic for review       BP Readings from Last 3 Encounters:   06/26/23 (!) 150/80   07/28/22 (!) 162/80   01/27/22 (!) 160/76       Calcium Channel Blockers Protocol  Vkrfvm04/18/2023 01:58 AM   Protocol Details Blood pressure under 140/90 in past 12 months         well-groomed/obese/well-nourished/well-developed

## 2023-12-04 DIAGNOSIS — M54.16 LUMBAR RADICULOPATHY: ICD-10-CM

## 2023-12-04 RX ORDER — GABAPENTIN 300 MG/1
300 CAPSULE ORAL 3 TIMES DAILY
Qty: 270 CAPSULE | Refills: 6 | Status: SHIPPED | OUTPATIENT
Start: 2023-12-04

## 2023-12-19 ENCOUNTER — TRANSFERRED RECORDS (OUTPATIENT)
Dept: HEALTH INFORMATION MANAGEMENT | Facility: CLINIC | Age: 63
End: 2023-12-19
Payer: COMMERCIAL

## 2024-04-17 ENCOUNTER — TELEPHONE (OUTPATIENT)
Dept: CARDIOLOGY | Facility: CLINIC | Age: 64
End: 2024-04-17
Payer: COMMERCIAL

## 2024-04-17 NOTE — TELEPHONE ENCOUNTER
Left Voicemail (1st Attempt) for the patient to call back and schedule the following:    Appointment type: RTN  Provider: JESUS  Return date: 06/28/24  Specialty phone number: [phone] OPT 1   Additional appointment(s) needed: N/A   Additonal Notes: N/A

## 2024-06-11 DIAGNOSIS — E78.5 HYPERLIPIDEMIA LDL GOAL <100: ICD-10-CM

## 2024-06-13 ENCOUNTER — OFFICE VISIT (OUTPATIENT)
Dept: CARDIOLOGY | Facility: CLINIC | Age: 64
End: 2024-06-13
Attending: INTERNAL MEDICINE
Payer: COMMERCIAL

## 2024-06-13 VITALS
OXYGEN SATURATION: 96 % | HEART RATE: 66 BPM | WEIGHT: 212.4 LBS | SYSTOLIC BLOOD PRESSURE: 132 MMHG | BODY MASS INDEX: 29.64 KG/M2 | DIASTOLIC BLOOD PRESSURE: 74 MMHG

## 2024-06-13 DIAGNOSIS — I35.0 AORTIC VALVE STENOSIS, ETIOLOGY OF CARDIAC VALVE DISEASE UNSPECIFIED: ICD-10-CM

## 2024-06-13 DIAGNOSIS — E78.5 HYPERLIPIDEMIA LDL GOAL <100: ICD-10-CM

## 2024-06-13 PROCEDURE — 99213 OFFICE O/P EST LOW 20 MIN: CPT | Performed by: INTERNAL MEDICINE

## 2024-06-13 PROCEDURE — 99214 OFFICE O/P EST MOD 30 MIN: CPT | Performed by: INTERNAL MEDICINE

## 2024-06-13 ASSESSMENT — PAIN SCALES - GENERAL: PAINLEVEL: NO PAIN (0)

## 2024-06-13 NOTE — NURSING NOTE
Chief Complaint   Patient presents with    Follow Up     Reason for visit: Cl follow up    Cardiac Dx: HTN, HLD, CAC        Vitals were taken and medications reconciled.    Marco A Carranza, EMT  3:00 PM

## 2024-06-13 NOTE — LETTER
6/13/2024      RE: Yasir Hines  [address] [facility] [address]       Dear Colleague,    Thank you for the opportunity to participate in the care of your patient, Yasir Hines, at the [facility] HE[facility] [place] at [facility]. Please see a copy of my visit note below.    HPI:     I had the privilege to evaluate and examine Mr Yasir Hines, who is a 64 yr male patient with a Hx of hypercholesterolemia, hypertension.    Patient denies chest pain, shortness of breath, palpitations and intermittent claudication.    A coronary CT CAC showed showed a CAC score of 319 placing patient in 83rd percentile for risk of developing CAD    His major complaints is now low back pain and paresthesias and pain in his legs.    His BP is well controlled.       PAST MEDICAL HISTORY:    Hypertension  Hypercholesterolemia  Syncope, collapse  Lumbar radiculopathy       Herpes zoster without complication 1/12/2018   Left back T9 dermatomal distribution     Hypercholesteremia 2/12/2010     LTBI (latent tuberculosis infection) 5/4/2017     Onychomycosis 2/12/2010     Palpitations 2/12/2010     Prostate cancer (HC) 01/2014       URRENT MEDICATIONS:  Current Outpatient Medications   Medication Sig Dispense Refill     amLODIPine (NORVASC) 5 MG tablet Take 1 tablet (5 mg) by mouth daily 90 tablet 3     atorvastatin (LIPITOR) 40 MG tablet Take 1 tablet (40 mg) by mouth daily 90 tablet 3     ezetimibe (ZETIA) 10 MG tablet Take 1 tablet (10 mg) by mouth daily 90 tablet 2     flurbiprofen (ANSAID) 100 MG tablet Take 1 tablet (100 mg) by mouth 3 times daily as needed (moderate pain) 90 tablet 3     Glucosamine Sulfate 1000 MG TABS        losartan-hydrochlorothiazide (HYZAAR) 50-12.5 MG tablet Take 1 tablet by mouth daily 90 tablet 3     omega 3 1000 MG CAPS        aspirin (ASPIRIN LOW DOSE) 81 MG EC tablet Take 1 tablet (81 mg) by mouth daily (Patient not taking:  Reported on 6/26/2023) 90 tablet 2     cyclobenzaprine (FLEXERIL) 5 MG tablet Take 1-2 tablets (5-10 mg) by mouth 3 times daily as needed for muscle spasms (Patient not taking: Reported on 7/28/2022) 90 tablet 1     gabapentin (NEURONTIN) 300 MG capsule Take 1 capsule (300 mg) by mouth 3 times daily (Patient not taking: Reported on 6/13/2024) 270 capsule 6     meloxicam (MOBIC) 7.5 MG tablet Take 2 tablets (15 mg) by mouth daily (Patient not taking: Reported on 6/13/2024) 60 tablet 1     tiZANidine (ZANAFLEX) 2 MG tablet Take 1 tablet (2 mg) by mouth 3 times daily as needed for muscle spasms (Patient not taking: Reported on 1/27/2022) 30 tablet 0       PAST SURGICAL HISTORY:    CARPAL TUNNEL RELEASE Right 2007     COLONOSCOPY 04/29/09   [facility] repeat 5 years     COLONOSCOPY SCREENING 2001 2004     HEMORRHOIDECTOMY 06/98     TRIGGER FINGER RELEASE Right   In thumb       ALLERGIES   No Known Allergies    FAMILY HISTORY:    Family History of Colon Cancer, Father, one Grandmother, last exam 5/12/14, one Sessile serrated adenoma; 5 yr interval 3/10/2010     Family History of Pancreatic Cancer, Maternal GM 3/10/2010       SOCIAL HISTORY:  Social History     Socioeconomic History     Marital status:      Spouse name: None     Number of children: None     Years of education: None     Highest education level: None   Tobacco Use     Smoking status: Never     Smokeless tobacco: Never   Substance and Sexual Activity     Alcohol use: Yes     Comment: occasionally     Drug use: Never     Social Determinants of Health     Financial Resource Strain: Low Risk  (5/8/2024)    Received from [employer]    Financial Resource Strain      Difficulty of Paying Living Expenses: 3   Food Insecurity: No Food Insecurity (5/8/2024)    Received from [employer]    Food Insecurity      Worried About Running Out of Food in the Last Year: 1   Transportation Needs: No  Transportation Needs (5/8/2024)    Received from [facility]    Transportation Needs      Lack of Transportation (Medical): 1   Social Connections: Socially Integrated (5/8/2024)    Received from [facility]    Social Connections      Frequency of Communication with Friends and Family: 0   Housing Stability: Low Risk  (5/8/2024)    [facility]    Housing Stability      Unable to Pay for Housing in the Last Year: 1       ROS:   Constitutional: No fever, chills, or sweats. No weight gain/loss   ENT: No visual disturbance, ear ache, epistaxis, sore throat  Allergies/Immunologic: Negative.   Respiratory: No cough, hemoptysia  Cardiovascular: As per HPI  GI: No nausea, vomiting, hematemesis, melena, or hematochezia  : No urinary frequency, dysuria, or hematuria  Integument: Negative  Psychiatric: Negative  Neuro: Negative  Endocrinology: Negative   Musculoskeletal: Negative    EXAM:  /74 (BP Location: Right arm, Patient Position: Chair, Cuff Size: Adult Regular)   Pulse 66   Wt 96.3 kg (212 lb 6.4 oz)   SpO2 96%   BMI 29.64 kg/m    In general, the patient is a pleasant male in no apparent distress.    HEENT: NC/AT.  PERRLA.  EOMI.  Sclerae white, not injected.  Nares clear.  Pharynx without erythema or exudate.  Dentition intact.    Neck: No adenopathy.  No thyromegaly. Carotids +4/4 bilaterally without bruits.  No jugular venous distension.   Heart: RRR. Normal S1, S2 splits physiologically. No murmur, rub, click, or gallop. The PMI is in the 5th ICS in the midclavicular line. There is no heave.    Lungs: CTA.  No ronchi, wheezes, rales.  No dullness to percussion.   Abdomen: Soft, nontender, nondistended. No organomegaly.  No bruits.   Extremities: No clubbing, cyanosis, or edema.  The pulses are +4/4 at the radial, brachial, femoral, popliteal, DP, and PT sites bilaterally.  No bruits are  noted.  Neurologic: Alert and oriented to person/place/time, normal speech, gait and affect  Skin: No petechiae, purpura or rash.    Labs:    HOLESTEROL,TOTAL  100 - 199 mg/dL 137   TRIGLYCERIDES  <150 mg/dL 74   HDL CHOLESTEROL  >40 mg/dL 43   NON-HDL CHOLESTEROL  <145 mg/dl 94   CHOL/HDL RATIO  <4.50 3.19   LDL CHOLESTEROL  <=130 mg/dL 79   VLDL CHOLESTEROL  <=30 mg/dL 15     ODIUM  136 - 145 mmol/L 138   POTASSIUM  3.5 - 5.1 mmol/L 4.1   CHLORIDE  98 - 107 mmol/L 101   CO2,TOTAL  22 - 29 mmol/L 28   ANION GAP  5 - 18 9   GLUCOSE  70 - 99 mg/dL 95   CALCIUM  8.8 - 10.2 mg/dL 9.7   BUN  8 - 23 mg/dL 17   CREATININE  0.70 - 1.20 mg/dL 1.08   BUN/CREAT RATIO  10 - 20 16   eGFR  >90 mL/min/1.73m2 77 Low    ALBUMIN  4.0 - 4.9 g/dL 4.4   PROTEIN,TOTAL  6.0 - 8.0 g/dL 7.9   BILIRUBIN,TOTAL  0.0 - 1.2 mg/dL 0.8   ALK PHOSPHATASE  40 - 129 IU/L 68   ALT (SGPT)  10 - 50 IU/L 36   AST (SGOT)  10 - 50 IU/L 30       DANIA BLOOD COUNT          4.5 - 11.0 thou/cu mm 4.9   RED BLOOD COUNT            4.30 - 5.90 mil/cu mm 4.73   HEMOGLOBIN                13.5 - 17.5 g/dL 14.0   HEMATOCRIT                37.0 - 53.0 % 41.8   MCV                        80 - 100 fL 88   MCH                        26.0 - 34.0 pg 29.6   MCHC                      32.0 - 36.0 g/dL 33.5   RDW                        11.5 - 15.5 % 14.7   PLATELET COUNT            140 - 440 thou/cu mm 143   MPV                        6.5 - 11.0 fL 11.9 High    % NEUT  % 61.4   % LYMPH  % 27.0   % MONO  % 10.4   % EOS  % 1.0   % BASO  % 0.2   ABSOLUTE NEUTROPHILS      1.7 - 7.0 thou/cu mm 3.0   ABSOLUTE LYMPHOCYTES      0.9 - 2.9 thou/cu mm 1.3   ABSOLUTE MONOCYTES        <0.9 thou/cu mm 0.5   ABSOLUTE EOSINOPHILS      <0.5 thou/cu mm 0.1   ABSOLUTE BASOPHILS        <0.3 thou/cu mm 0.0     Ref Range & Units 1 mo ago   PSA TOTAL (DIAGNOSTIC)  <4.00 ng/mL 2.77         LIPID RESULTS:  Lab Results   Component Value Date    CHOL 138 06/26/2023    HDL 42 06/26/2023    LDL 85  06/26/2023    LDL 78 06/26/2023    TRIG 89 06/26/2023    NHDL 96 06/26/2023       LIVER ENZYME RESULTS:  Lab Results   Component Value Date    AST 25 06/26/2023    ALT 33 06/26/2023         BMP RESULTS:  Lab Results   Component Value Date     06/26/2023    POTASSIUM 3.9 06/26/2023    POTASSIUM 4.3 07/27/2022    CHLORIDE 103 06/26/2023    CHLORIDE 108 07/27/2022    CO2 27 06/26/2023    CO2 28 07/27/2022    ANIONGAP 8 06/26/2023    ANIONGAP <1 (L) 07/27/2022    GLC 96 06/26/2023    GLC 98 07/27/2022    BUN 17.5 06/26/2023    BUN 19 07/27/2022    CR 1.02 06/26/2023    GFRESTIMATED 83 06/26/2023    PAMELA 9.5 06/26/2023        Assessment and Plan:     We discussed the results with patient,  We discussed the importance of a heart healthy diet and lifestyle.  We discussed following items:    Medication Changes: None     Follow Up: With cardiology in 1 year with fasting labs prior to visit.      Follow the American Heart Association Diet and Lifestyle Recommendations:  -Limit saturated fat, trans fat, sodium, red meat, sweets and sugar-sweetened beverages. If you choose to eat red meat, compare labels and select the leanest cuts available.    Jerry Lee MD, PhD  Professor of Medicine  Division of Cardiology       CC  Patient Care Team:  Hay Melendez as PCP - General (Internal Medicine)  Jerry Lee MD as MD (Cardiovascular Disease)  Jerry Lee MD as Assigned Heart and Vascular Provider  Giselle Blake, RN as Specialty Care Coordinator (Cardiology)  JERRY LEE      Please do not hesitate to contact me if you have any questions/concerns.     Sincerely,     Jerry Lee MD

## 2024-06-13 NOTE — PATIENT INSTRUCTIONS
June 13, 2024    Cardiology Provider You Saw During Your Visit: Dr. Smallwood      Medication Changes: None      Follow Up: With cardiology in 1 year with fasting labs prior to visit.      Follow the American Heart Association Diet and Lifestyle Recommendations:  -Limit saturated fat, trans fat, sodium, red meat, sweets and sugar-sweetened beverages. If you choose to eat red meat, compare labels and select the leanest cuts available.  -Aim for at least 150 minutes of moderate physical activity or 75 minutes of vigorous physical activity - or an equal combination of both - each week.      To Reach Us:  -During business hours: [phone], press option # 1 to schedule an appointment or to leave a message for your care team.     -After hours, weekends or holidays: [phone], press option #4 and ask to speak to the on-call cardiologist. Inform them you are a patient at the [facility].        **If you have a cardiac device, please make sure you schedule an in-person device check just prior to your cardiology provider appointments**        Giselle Blake RN  Cardiology Care Coordinator - General Cardiology  [facility]

## 2024-06-13 NOTE — PROGRESS NOTES
HPI:     I had the privilege to evaluate and examine Mr Yasir Hines, who is a 64 yr male patient with a Hx of hypercholesterolemia, hypertension.    Patient denies chest pain, shortness of breath, palpitations and intermittent claudication.    A coronary CT CAC showed showed a CAC score of 319 placing patient in 83rd percentile for risk of developing CAD    His major complaints is now low back pain and paresthesias and pain in his legs.    His BP is well controlled.       PAST MEDICAL HISTORY:    Hypertension  Hypercholesterolemia  Syncope, collapse  Lumbar radiculopathy      Herpes zoster without complication 1/12/2018   Left back T9 dermatomal distribution    Hypercholesteremia 2/12/2010    LTBI (latent tuberculosis infection) 5/4/2017    Onychomycosis 2/12/2010    Palpitations 2/12/2010    Prostate cancer (HC) 01/2014       URRENT MEDICATIONS:  Current Outpatient Medications   Medication Sig Dispense Refill    amLODIPine (NORVASC) 5 MG tablet Take 1 tablet (5 mg) by mouth daily 90 tablet 3    atorvastatin (LIPITOR) 40 MG tablet Take 1 tablet (40 mg) by mouth daily 90 tablet 3    ezetimibe (ZETIA) 10 MG tablet Take 1 tablet (10 mg) by mouth daily 90 tablet 2    flurbiprofen (ANSAID) 100 MG tablet Take 1 tablet (100 mg) by mouth 3 times daily as needed (moderate pain) 90 tablet 3    Glucosamine Sulfate 1000 MG TABS       losartan-hydrochlorothiazide (HYZAAR) 50-12.5 MG tablet Take 1 tablet by mouth daily 90 tablet 3    omega 3 1000 MG CAPS       aspirin (ASPIRIN LOW DOSE) 81 MG EC tablet Take 1 tablet (81 mg) by mouth daily (Patient not taking: Reported on 6/26/2023) 90 tablet 2    cyclobenzaprine (FLEXERIL) 5 MG tablet Take 1-2 tablets (5-10 mg) by mouth 3 times daily as needed for muscle spasms (Patient not taking: Reported on 7/28/2022) 90 tablet 1    gabapentin (NEURONTIN) 300 MG capsule Take 1 capsule (300 mg) by mouth 3 times daily (Patient not taking: Reported on 6/13/2024) 270 capsule 6    meloxicam  (MOBIC) 7.5 MG tablet Take 2 tablets (15 mg) by mouth daily (Patient not taking: Reported on 6/13/2024) 60 tablet 1    tiZANidine (ZANAFLEX) 2 MG tablet Take 1 tablet (2 mg) by mouth 3 times daily as needed for muscle spasms (Patient not taking: Reported on 1/27/2022) 30 tablet 0       PAST SURGICAL HISTORY:    CARPAL TUNNEL RELEASE Right 2007    COLONOSCOPY 04/29/09   [facility] repeat 5 years    COLONOSCOPY SCREENING 2001 2004    HEMORRHOIDECTOMY 06/98    TRIGGER FINGER RELEASE Right   In thumb       ALLERGIES   No Known Allergies    FAMILY HISTORY:    Family History of Colon Cancer, Father, one Grandmother, last exam 5/12/14, one Sessile serrated adenoma; 5 yr interval 3/10/2010    Family History of Pancreatic Cancer, Maternal GM 3/10/2010       SOCIAL HISTORY:  Social History     Socioeconomic History    Marital status:      Spouse name: None    Number of children: None    Years of education: None    Highest education level: None   Tobacco Use    Smoking status: Never    Smokeless tobacco: Never   Substance and Sexual Activity    Alcohol use: Yes     Comment: occasionally    Drug use: Never     Social Determinants of Health     Financial Resource Strain: Low Risk  (5/8/2024)    Received from [employer]    Financial Resource Strain     Difficulty of Paying Living Expenses: 3   Food Insecurity: No Food Insecurity (5/8/2024)    Received from [facility]    Food Insecurity     Worried About Running Out of Food in the Last Year: 1   Transportation Needs: No Transportation Needs (5/8/2024)    Received from [facility]    Transportation Needs     Lack of Transportation (Medical): 1   Social Connections: Socially Integrated (5/8/2024)    Received from [facility]    Social Connections     Frequency of Communication with Friends and Family: 0   Housing Stability: Low Risk  (5/8/2024)     Received from [facility]    Housing Stability     Unable to Pay for Housing in the Last Year: 1       ROS:   Constitutional: No fever, chills, or sweats. No weight gain/loss   ENT: No visual disturbance, ear ache, epistaxis, sore throat  Allergies/Immunologic: Negative.   Respiratory: No cough, hemoptysia  Cardiovascular: As per HPI  GI: No nausea, vomiting, hematemesis, melena, or hematochezia  : No urinary frequency, dysuria, or hematuria  Integument: Negative  Psychiatric: Negative  Neuro: Negative  Endocrinology: Negative   Musculoskeletal: Negative    EXAM:  /74 (BP Location: Right arm, Patient Position: Chair, Cuff Size: Adult Regular)   Pulse 66   Wt 96.3 kg (212 lb 6.4 oz)   SpO2 96%   BMI 29.64 kg/m    In general, the patient is a pleasant male in no apparent distress.    HEENT: NC/AT.  PERRLA.  EOMI.  Sclerae white, not injected.  Nares clear.  Pharynx without erythema or exudate.  Dentition intact.    Neck: No adenopathy.  No thyromegaly. Carotids +4/4 bilaterally without bruits.  No jugular venous distension.   Heart: RRR. Normal S1, S2 splits physiologically. No murmur, rub, click, or gallop. The PMI is in the 5th ICS in the midclavicular line. There is no heave.    Lungs: CTA.  No ronchi, wheezes, rales.  No dullness to percussion.   Abdomen: Soft, nontender, nondistended. No organomegaly.  No bruits.   Extremities: No clubbing, cyanosis, or edema.  The pulses are +4/4 at the radial, brachial, femoral, popliteal, DP, and PT sites bilaterally.  No bruits are noted.  Neurologic: Alert and oriented to person/place/time, normal speech, gait and affect  Skin: No petechiae, purpura or rash.    Labs:    HOLESTEROL,TOTAL  100 - 199 mg/dL 137   TRIGLYCERIDES  <150 mg/dL 74   HDL CHOLESTEROL  >40 mg/dL 43   NON-HDL CHOLESTEROL  <145 mg/dl 94   CHOL/HDL RATIO  <4.50 3.19   LDL CHOLESTEROL  <=130 mg/dL 79   VLDL CHOLESTEROL  <=30 mg/dL 15     ODIUM  136 - 145 mmol/L  138   POTASSIUM  3.5 - 5.1 mmol/L 4.1   CHLORIDE  98 - 107 mmol/L 101   CO2,TOTAL  22 - 29 mmol/L 28   ANION GAP  5 - 18 9   GLUCOSE  70 - 99 mg/dL 95   CALCIUM  8.8 - 10.2 mg/dL 9.7   BUN  8 - 23 mg/dL 17   CREATININE  0.70 - 1.20 mg/dL 1.08   BUN/CREAT RATIO  10 - 20 16   eGFR  >90 mL/min/1.73m2 77 Low    ALBUMIN  4.0 - 4.9 g/dL 4.4   PROTEIN,TOTAL  6.0 - 8.0 g/dL 7.9   BILIRUBIN,TOTAL  0.0 - 1.2 mg/dL 0.8   ALK PHOSPHATASE  40 - 129 IU/L 68   ALT (SGPT)  10 - 50 IU/L 36   AST (SGOT)  10 - 50 IU/L 30       DANIA BLOOD COUNT          4.5 - 11.0 thou/cu mm 4.9   RED BLOOD COUNT            4.30 - 5.90 mil/cu mm 4.73   HEMOGLOBIN                13.5 - 17.5 g/dL 14.0   HEMATOCRIT                37.0 - 53.0 % 41.8   MCV                        80 - 100 fL 88   MCH                        26.0 - 34.0 pg 29.6   MCHC                      32.0 - 36.0 g/dL 33.5   RDW                        11.5 - 15.5 % 14.7   PLATELET COUNT            140 - 440 thou/cu mm 143   MPV                        6.5 - 11.0 fL 11.9 High    % NEUT  % 61.4   % LYMPH  % 27.0   % MONO  % 10.4   % EOS  % 1.0   % BASO  % 0.2   ABSOLUTE NEUTROPHILS      1.7 - 7.0 thou/cu mm 3.0   ABSOLUTE LYMPHOCYTES      0.9 - 2.9 thou/cu mm 1.3   ABSOLUTE MONOCYTES        <0.9 thou/cu mm 0.5   ABSOLUTE EOSINOPHILS      <0.5 thou/cu mm 0.1   ABSOLUTE BASOPHILS        <0.3 thou/cu mm 0.0     Ref Range & Units 1 mo ago   PSA TOTAL (DIAGNOSTIC)  <4.00 ng/mL 2.77         LIPID RESULTS:  Lab Results   Component Value Date    CHOL 138 06/26/2023    HDL 42 06/26/2023    LDL 85 06/26/2023    LDL 78 06/26/2023    TRIG 89 06/26/2023    NHDL 96 06/26/2023       LIVER ENZYME RESULTS:  Lab Results   Component Value Date    AST 25 06/26/2023    ALT 33 06/26/2023         BMP RESULTS:  Lab Results   Component Value Date     06/26/2023    POTASSIUM 3.9 06/26/2023    POTASSIUM 4.3 07/27/2022    CHLORIDE 103 06/26/2023    CHLORIDE 108 07/27/2022    CO2 27 06/26/2023    CO2 28 07/27/2022     ANIONGAP 8 06/26/2023    ANIONGAP <1 (L) 07/27/2022    GLC 96 06/26/2023    GLC 98 07/27/2022    BUN 17.5 06/26/2023    BUN 19 07/27/2022    CR 1.02 06/26/2023    GFRESTIMATED 83 06/26/2023    PAMELA 9.5 06/26/2023        Assessment and Plan:     We discussed the results with patient,  We discussed the importance of a heart healthy diet and lifestyle.  We discussed following items:    Medication Changes: None     Follow Up: With cardiology in 1 year with fasting labs prior to visit.      Follow the American Heart Association Diet and Lifestyle Recommendations:  -Limit saturated fat, trans fat, sodium, red meat, sweets and sugar-sweetened beverages. If you choose to eat red meat, compare labels and select the leanest cuts available.    Jerry Lee MD, PhD  Professor of Medicine  Division of Cardiology       CC  Patient Care Team:  Hay Melendez as PCP - General (Internal Medicine)  Jerry Lee MD as MD (Cardiovascular Disease)  Jerry Lee MD as Assigned Heart and Vascular Provider  Giselle Blake RN as Specialty Care Coordinator (Cardiology)  JERRY LEE

## 2024-06-19 ENCOUNTER — MYC MEDICAL ADVICE (OUTPATIENT)
Dept: CARDIOLOGY | Facility: CLINIC | Age: 64
End: 2024-06-19
Payer: COMMERCIAL

## 2024-06-19 ENCOUNTER — MYC REFILL (OUTPATIENT)
Dept: CARDIOLOGY | Facility: CLINIC | Age: 64
End: 2024-06-19
Payer: COMMERCIAL

## 2024-06-19 DIAGNOSIS — I25.10 CORONARY ARTERY DISEASE INVOLVING NATIVE CORONARY ARTERY OF NATIVE HEART WITHOUT ANGINA PECTORIS: Primary | ICD-10-CM

## 2024-06-19 DIAGNOSIS — E78.5 HYPERLIPIDEMIA LDL GOAL <100: ICD-10-CM

## 2024-06-19 RX ORDER — EZETIMIBE 10 MG/1
10 TABLET ORAL DAILY
Qty: 90 TABLET | Refills: 2 | Status: CANCELLED | OUTPATIENT
Start: 2024-06-19

## 2024-06-19 RX ORDER — EZETIMIBE 10 MG/1
10 TABLET ORAL DAILY
Qty: 90 TABLET | Refills: 3 | Status: SHIPPED | OUTPATIENT
Start: 2024-06-19

## 2024-06-19 NOTE — TELEPHONE ENCOUNTER
ezetimibe (ZETIA) 10 MG tablet 90 tablet 2 9/20/2023     Last Office Visit: 6/13/24  Future Office visit:   none    Antihyperlipidemic agents Passed     Candace Villanueva RN  P Red Flag Triage/MRT

## 2024-06-24 RX ORDER — PRAVASTATIN SODIUM 20 MG
20 TABLET ORAL DAILY
Qty: 90 TABLET | Refills: 3 | Status: SHIPPED | OUTPATIENT
Start: 2024-06-24

## 2024-06-25 ENCOUNTER — TELEPHONE (OUTPATIENT)
Dept: CARDIOLOGY | Facility: CLINIC | Age: 64
End: 2024-06-25
Payer: COMMERCIAL

## 2024-06-25 ENCOUNTER — TRANSFERRED RECORDS (OUTPATIENT)
Dept: HEALTH INFORMATION MANAGEMENT | Facility: CLINIC | Age: 64
End: 2024-06-25
Payer: COMMERCIAL

## 2024-06-25 NOTE — TELEPHONE ENCOUNTER
6/25 Patient confirmed scheduled appointment:  Date: 9/16/2024  Time: 9:00 am  Visit type: labs (fasting)   Provider: Cl  Location: [place]  Testing/imaging: n/a  Additional notes: n/a

## 2024-09-16 ENCOUNTER — LAB (OUTPATIENT)
Dept: LAB | Facility: CLINIC | Age: 64
End: 2024-09-16
Payer: COMMERCIAL

## 2024-09-16 DIAGNOSIS — I25.10 CORONARY ARTERY DISEASE INVOLVING NATIVE CORONARY ARTERY OF NATIVE HEART WITHOUT ANGINA PECTORIS: ICD-10-CM

## 2024-09-16 LAB
CHOLEST SERPL-MCNC: 191 MG/DL
FASTING STATUS PATIENT QL REPORTED: YES
HDLC SERPL-MCNC: 36 MG/DL
LDLC SERPL CALC-MCNC: 129 MG/DL
LDLC SERPL DIRECT ASSAY-MCNC: 130 MG/DL
NONHDLC SERPL-MCNC: 155 MG/DL
TRIGL SERPL-MCNC: 131 MG/DL

## 2024-09-16 PROCEDURE — 36415 COLL VENOUS BLD VENIPUNCTURE: CPT

## 2024-09-16 PROCEDURE — 83721 ASSAY OF BLOOD LIPOPROTEIN: CPT

## 2024-09-16 PROCEDURE — 80061 LIPID PANEL: CPT

## 2024-09-24 ENCOUNTER — TELEPHONE (OUTPATIENT)
Dept: CARDIOLOGY | Facility: CLINIC | Age: 64
End: 2024-09-24
Payer: COMMERCIAL

## 2024-09-24 DIAGNOSIS — Z78.9 STATIN INTOLERANCE: ICD-10-CM

## 2024-09-24 DIAGNOSIS — I25.10 CORONARY ARTERY DISEASE INVOLVING NATIVE CORONARY ARTERY OF NATIVE HEART WITHOUT ANGINA PECTORIS: Primary | ICD-10-CM

## 2024-09-24 DIAGNOSIS — E78.5 HLD (HYPERLIPIDEMIA): ICD-10-CM

## 2024-10-04 ENCOUNTER — MYC MEDICAL ADVICE (OUTPATIENT)
Dept: CARDIOLOGY | Facility: CLINIC | Age: 64
End: 2024-10-04
Payer: COMMERCIAL

## 2024-10-06 DIAGNOSIS — R55 SYNCOPE, UNSPECIFIED SYNCOPE TYPE: ICD-10-CM

## 2024-10-06 DIAGNOSIS — I10 ESSENTIAL HYPERTENSION: ICD-10-CM

## 2024-10-10 DIAGNOSIS — I10 ESSENTIAL HYPERTENSION: ICD-10-CM

## 2024-10-10 DIAGNOSIS — R55 SYNCOPE, UNSPECIFIED SYNCOPE TYPE: ICD-10-CM

## 2024-10-10 RX ORDER — LOSARTAN POTASSIUM AND HYDROCHLOROTHIAZIDE 12.5; 5 MG/1; MG/1
1 TABLET ORAL DAILY
Qty: 90 TABLET | Refills: 3 | OUTPATIENT
Start: 2024-10-10

## 2024-10-10 RX ORDER — LOSARTAN POTASSIUM AND HYDROCHLOROTHIAZIDE 12.5; 5 MG/1; MG/1
1 TABLET ORAL DAILY
Qty: 90 TABLET | Refills: 2 | Status: SHIPPED | OUTPATIENT
Start: 2024-10-10

## 2024-10-10 RX ORDER — AMLODIPINE BESYLATE 5 MG/1
5 TABLET ORAL DAILY
Qty: 90 TABLET | Refills: 2 | Status: SHIPPED | OUTPATIENT
Start: 2024-10-10

## 2024-10-10 RX ORDER — AMLODIPINE BESYLATE 5 MG/1
5 TABLET ORAL DAILY
Qty: 90 TABLET | Refills: 3 | OUTPATIENT
Start: 2024-10-10

## 2024-10-11 DIAGNOSIS — E78.5 HLD (HYPERLIPIDEMIA): ICD-10-CM

## 2024-10-11 DIAGNOSIS — Z78.9 STATIN INTOLERANCE: ICD-10-CM

## 2024-10-11 DIAGNOSIS — I25.10 CORONARY ARTERY DISEASE INVOLVING NATIVE CORONARY ARTERY OF NATIVE HEART WITHOUT ANGINA PECTORIS: Primary | ICD-10-CM

## 2024-10-14 ENCOUNTER — ALLIED HEALTH/NURSE VISIT (OUTPATIENT)
Dept: CARDIOLOGY | Facility: CLINIC | Age: 64
End: 2024-10-14
Payer: COMMERCIAL

## 2024-10-14 DIAGNOSIS — E78.5 HLD (HYPERLIPIDEMIA): Primary | ICD-10-CM

## 2024-10-14 DIAGNOSIS — Z78.9 STATIN INTOLERANCE: ICD-10-CM

## 2024-10-14 DIAGNOSIS — I25.10 CORONARY ARTERY DISEASE INVOLVING NATIVE CORONARY ARTERY OF NATIVE HEART WITHOUT ANGINA PECTORIS: ICD-10-CM

## 2024-10-14 DIAGNOSIS — E78.5 HLD (HYPERLIPIDEMIA): ICD-10-CM

## 2024-10-14 DIAGNOSIS — I25.10 CORONARY ARTERY DISEASE INVOLVING NATIVE CORONARY ARTERY OF NATIVE HEART WITHOUT ANGINA PECTORIS: Primary | ICD-10-CM

## 2024-10-14 PROCEDURE — 96372 THER/PROPH/DIAG INJ SC/IM: CPT | Performed by: INTERNAL MEDICINE

## 2024-10-14 PROCEDURE — 250N000011 HC RX IP 250 OP 636: Mod: JZ | Performed by: INTERNAL MEDICINE

## 2024-10-14 RX ADMIN — INCLISIRAN 284 MG: 284 INJECTION, SOLUTION SUBCUTANEOUS at 10:16

## 2024-10-14 NOTE — PROGRESS NOTES
Pt received first Leqvio injection today, October 14, 2024, and tolerated well. Next injection for in 3 months; scheduled for 1/13/25.

## 2024-10-14 NOTE — PROGRESS NOTES
Clinic Administered Medication Documentation      Injectable Medication Documentation    Is there an active order (written within the past 365 days, with administrations remaining, not ) in the chart? Yes.     Patient was given Inclisiran (Leqvio). Prior to medication administration, verified patient's identity using patient s name and date of birth. Please see MAR and medication order for additional information. Patient instructed to remain in clinic for 15 minutes and report any adverse reaction to staff immediately.    Vial/Syringe: Syringe  Was this medication supplied by the patient? No  Is this a medication the patient will need to receive again? Yes. Verified that the patient has refills remaining in their prescription.

## 2024-12-31 ENCOUNTER — TRANSFERRED RECORDS (OUTPATIENT)
Dept: HEALTH INFORMATION MANAGEMENT | Facility: CLINIC | Age: 64
End: 2024-12-31

## 2025-01-08 ENCOUNTER — MYC MEDICAL ADVICE (OUTPATIENT)
Dept: CARDIOLOGY | Facility: CLINIC | Age: 65
End: 2025-01-08
Payer: COMMERCIAL

## 2025-01-09 DIAGNOSIS — I25.10 CORONARY ARTERY DISEASE INVOLVING NATIVE CORONARY ARTERY OF NATIVE HEART WITHOUT ANGINA PECTORIS: ICD-10-CM

## 2025-01-09 DIAGNOSIS — E78.5 HLD (HYPERLIPIDEMIA): Primary | ICD-10-CM

## 2025-01-09 DIAGNOSIS — Z78.9 STATIN INTOLERANCE: ICD-10-CM

## 2025-01-13 ENCOUNTER — LAB (OUTPATIENT)
Dept: LAB | Facility: CLINIC | Age: 65
End: 2025-01-13
Payer: COMMERCIAL

## 2025-01-13 DIAGNOSIS — E78.5 HLD (HYPERLIPIDEMIA): ICD-10-CM

## 2025-01-13 LAB
CHOLEST SERPL-MCNC: 214 MG/DL
FASTING STATUS PATIENT QL REPORTED: YES
HDLC SERPL-MCNC: 40 MG/DL
LDLC SERPL CALC-MCNC: 148 MG/DL
LDLC SERPL DIRECT ASSAY-MCNC: 154 MG/DL
NONHDLC SERPL-MCNC: 174 MG/DL
TRIGL SERPL-MCNC: 128 MG/DL

## 2025-01-13 PROCEDURE — 80061 LIPID PANEL: CPT | Performed by: PATHOLOGY

## 2025-01-13 PROCEDURE — 83721 ASSAY OF BLOOD LIPOPROTEIN: CPT | Performed by: INTERNAL MEDICINE

## 2025-01-13 PROCEDURE — 36415 COLL VENOUS BLD VENIPUNCTURE: CPT | Performed by: PATHOLOGY

## 2025-01-13 PROCEDURE — 99000 SPECIMEN HANDLING OFFICE-LAB: CPT | Performed by: PATHOLOGY

## 2025-02-06 ENCOUNTER — TELEPHONE (OUTPATIENT)
Dept: CARDIOLOGY | Facility: CLINIC | Age: 65
End: 2025-02-06
Payer: COMMERCIAL

## 2025-02-06 NOTE — TELEPHONE ENCOUNTER
[facility] Call Center    Phone Message    May a detailed message be left on voicemail: yes     Reason for Call: Other: fabrizio is calling to inform the care team that danny is not contracted with his medical benefits, please advise, thank you     Action Taken: Other: cardiology    Travel Screening: Not Applicable     Date of Service:

## 2025-02-06 NOTE — TELEPHONE ENCOUNTER
"Attempted to return call to Jaylene, but was told the extension she gave is an \"invalid entry.\"  "

## 2025-02-26 ENCOUNTER — MYC MEDICAL ADVICE (OUTPATIENT)
Dept: CARDIOLOGY | Facility: CLINIC | Age: 65
End: 2025-02-26
Payer: COMMERCIAL

## 2025-02-26 DIAGNOSIS — E78.5 HLD (HYPERLIPIDEMIA): Primary | ICD-10-CM

## 2025-02-26 DIAGNOSIS — Z78.9 STATIN INTOLERANCE: ICD-10-CM

## 2025-02-27 ENCOUNTER — TELEPHONE (OUTPATIENT)
Dept: CARDIOLOGY | Facility: CLINIC | Age: 65
End: 2025-02-27
Payer: COMMERCIAL

## 2025-02-27 NOTE — TELEPHONE ENCOUNTER
Hello,     Levqio is not a approved drug through [facility]. It may be that he is in network with us, however we are not able to dose through [facility] . Additionally, this drug has a sourcing policy so we are also not able to service the patient through [facility]. This patient does need to transition to [facility] and the orders have already been sent. We are not able to appeal this decision.     Please let me know if you have any further questions.    Amy Aaron  Pharmacy Operations [facility]  Office: [phone]  Fax: [phone]  [email or web address]

## 2025-02-27 NOTE — TELEPHONE ENCOUNTER
Prior Authorization Retail Medication Request    Medication/Dose: Repatha 140 mg    New/renewal/insurance change PA/secondary ins. PA: NEW  Previously Tried and Failed + Rationale: Patient is intolerant of high and low potency statins. Has a calcium score of 319. Is in need of PCSK9 inhibitor in order to help treat and manage HLD + CAD.    Insurance   Primary: [employer]  Insurance ID: [id number]     Clinic Information  Preferred routing pool for dept communication: [facility]

## 2025-02-27 NOTE — TELEPHONE ENCOUNTER
PRIOR AUTHORIZATION DENIED    Medication: Repatha 140mg/ml    Denial Date: 2/27/2025    Denial Rational: Repatha denied-  Plan is requiring a pre-treatment LDL-C of 190mg or higher with diagnosis of primary hyperlipidemia            Appeal Information: This medication was denied. If physician would like to appeal because patient has contraindication or allergy to covered medication please write letter of medical necessity and route back to PA team to initiate.  If no further action is needed please close encounter thank you.

## 2025-02-27 NOTE — TELEPHONE ENCOUNTER
Central Prior Authorization Team   Phone: [phone]    PA Initiation    Medication: Repatha 140mg/ml  Insurance Company: [employer] ([facility]) - Phone [phone] Fax [phone]  Pharmacy Filling the Rx: [id number] - [place], [place] - [address]  Filling Pharmacy Phone: [phone]  Filling Pharmacy Fax:    Start Date: 2/27/2025

## 2025-02-28 NOTE — TELEPHONE ENCOUNTER
Medication Appeal Initiation    We have initiated an appeal for the requested medication:  Medication: Repatha 140mg/ml  Appeal Start Date:  2/28/2025  Insurance Company:  [employer]  Comments:       Faxed appeal letter, clinic notes to [facility] appeals

## 2025-03-04 NOTE — TELEPHONE ENCOUNTER
Called [facility] to check on status of appeal [phone]  Per rep this was received on 2/28  Due date for determination is 3/15    PA appeal ref#  [id number]

## 2025-03-10 ENCOUNTER — MYC MEDICAL ADVICE (OUTPATIENT)
Dept: CARDIOLOGY | Facility: CLINIC | Age: 65
End: 2025-03-10
Payer: COMMERCIAL

## 2025-03-14 NOTE — TELEPHONE ENCOUNTER
MEDICATION APPEAL APPROVED    Medication: Repatha 140mg/ml  Authorization Effective Date:  3/14/2026  Authorization Expiration Date: 3/7/2026  Approved Dose/Quantity:  2 per 28 days   Reference #:     Insurance Company:    Expected CoPay:    $175.00 28 days    CoPay Card Available:      Foundation Assistance Needed:    Which Pharmacy is filling the prescription (Not needed for infusion/clinic administered): [facility] [id number] [address] [employer] - [address] [place], [place] - [address] Appeal approved, test claim pays out.  Pharmacy will contact patient when this is ready for .

## 2025-03-17 DIAGNOSIS — E78.5 HLD (HYPERLIPIDEMIA): Primary | ICD-10-CM

## 2025-05-27 DIAGNOSIS — I10 ESSENTIAL HYPERTENSION: ICD-10-CM

## 2025-05-28 NOTE — TELEPHONE ENCOUNTER
Last Written Prescription:  losartan-hydrochlorothiazide (HYZAAR) 50-12.5 MG tablet 90 tablet 2 10/10/2024 -- No   Sig - Route: Take 1 tablet by mouth daily. - Oral   Sent to pharmacy as: Losartan Potassium-HCTZ 50-12.5 MG Oral Tablet (HYZAAR)   Class: E-Prescribe   Order: [id number]     ----------------------  Last Visit Date: 6/13/24  Future Visit Date: 7/14/25  ----------------------        Refill decision: Medication unable to be refilled by RN due to: Overdue labs/test:      Last Comprehensive Metabolic Panel:  Lab Results   Component Value Date     06/26/2023    POTASSIUM 3.9 06/26/2023    CHLORIDE 103 06/26/2023    CO2 27 06/26/2023    ANIONGAP 8 06/26/2023    GLC 96 06/26/2023    BUN 17.5 06/26/2023    CR 1.02 06/26/2023    GFRESTIMATED 83 06/26/2023    PAMELA 9.5 06/26/2023           Request from pharmacy:  Requested Prescriptions   Pending Prescriptions Disp Refills    losartan-hydrochlorothiazide (HYZAAR) 50-12.5 MG tablet [Pharmacy Med Name: LOSARTAN-HCTZ 50-12.5 MG TAB] 90 tablet 2     Sig: TAKE 1 TABLET BY MOUTH EVERY DAY       Angiotensin-II Receptors Failed - 5/28/2025  4:09 PM        Failed - Has GFR on file in past 12 months and most recent value is normal        Failed - Normal serum potassium on file in past 12 months     Recent Labs   Lab Test 06/26/23  0905   POTASSIUM 3.9                    Passed - Most recent blood pressure under 140/90 in past 12 months     BP Readings from Last 3 Encounters:   06/13/24 132/74   06/26/23 (!) 150/80   07/28/22 (!) 162/80       No data recorded            Passed - Medication is active on med list and the sig matches. RN to manually verify dose and sig if red X/fail.     If the protocol passes (green check), you do not need to verify med dose and sig.    A prescription matches if they are the same clinical intention.    For Example: once daily and every morning are the same.    The protocol can not identify upper and lower case letters as matching and will  fail.     For Example: Take 1 tablet (50 mg) by mouth daily     TAKE 1 TABLET (50 MG) BY MOUTH DAILY    For all fails (red x), verify dose and sig.    If the refill does match what is on file, the RN can still proceed to approve the refill request.       If they do not match, route to the appropriate provider.             Passed - Medication indicated for associated diagnosis     The medication is prescribed for one or more of the following conditions:    Chronic Kidney Disease (CDK)    Heart Failure (HF)    Diabetes, Nephropathy   Hypertension    Coronary Artery Disease (CAD)   Raynaud's Disease   Ischemic cardiomyopathy   Cardiomyopathy   Pulmonary Hypertension          Passed - Recent (12 month) or future (90 days) visit with authorizing provider's specialty (provided they have been seen in the past 15 months)     The patient must have completed an in-person or virtual visit within the past 12 months or has a future visit scheduled within the next 90 days with the authorizing provider s specialty.  Urgent care and e-visits do not qualify as an office visit for this protocol.          Passed - Patient is age 18 or older       Diuretics (Including Combos) Protocol Failed - 5/28/2025  4:09 PM        Failed - Potassium level on file in past 12 months        Failed - Has GFR on file in past 12 months and most recent value is normal        Passed - Most recent blood pressure under 140/90 in past 12 months     BP Readings from Last 3 Encounters:   06/13/24 132/74   06/26/23 (!) 150/80   07/28/22 (!) 162/80       No data recorded            Passed - Medication is active on med list and the sig matches. RN to manually verify dose and sig if red X/fail.     If the protocol passes (green check), you do not need to verify med dose and sig.    A prescription matches if they are the same clinical intention.    For Example: once daily and every morning are the same.    The protocol can not identify upper and lower case letters  as matching and will fail.     For Example: Take 1 tablet (50 mg) by mouth daily     TAKE 1 TABLET (50 MG) BY MOUTH DAILY    For all fails (red x), verify dose and sig.    If the refill does match what is on file, the RN can still proceed to approve the refill request.       If they do not match, route to the appropriate provider.             Passed - Medication indicated for associated diagnosis     Medication is associated with one or more of the following diagnoses:     Edema   Hypertension   Heart Failure   Meniere's Disease   Bilateral localized swelling of lower limbs   Pulmonary Hypertension          Passed - Recent (12 month) or future (90 days) visit with authorizing provider's specialty (provided they have been seen in the past 15 months)     The patient must have completed an in-person or virtual visit within the past 12 months or has a future visit scheduled within the next 90 days with the authorizing provider s specialty.  Urgent care and e-visits do not qualify as an office visit for this protocol.          Passed - Patient is age 18 or older

## 2025-05-29 RX ORDER — LOSARTAN POTASSIUM AND HYDROCHLOROTHIAZIDE 12.5; 5 MG/1; MG/1
1 TABLET ORAL DAILY
Qty: 90 TABLET | Refills: 0 | Status: SHIPPED | OUTPATIENT
Start: 2025-05-29

## 2025-06-02 DIAGNOSIS — E78.5 HYPERLIPIDEMIA LDL GOAL <100: ICD-10-CM

## 2025-06-02 RX ORDER — EZETIMIBE 10 MG/1
10 TABLET ORAL DAILY
Qty: 90 TABLET | Refills: 2 | Status: SHIPPED | OUTPATIENT
Start: 2025-06-02

## 2025-06-02 NOTE — TELEPHONE ENCOUNTER
Last Written Prescription:   Disp Refills Start End GERALDINE   ezetimibe (ZETIA) 10 MG tablet 90 tablet 3 6/19/2024 -- No   Sig - Route: Take 1 tablet (10 mg) by mouth daily - Oral     ----------------------  Last Visit Date: 10/14/2024  [facility]    Future Visit Date:    7/14/2025 12:30 PM (30 min)  [place]   Arrive by: 12:15 PM   RETURN CARDIOLOGY   Rfl Status: Pending Review   CV ([facility])   Jerry Smallwood MD     ----------------------      Refill decision: Medication refilled per  Medication Refill in Ambulatory Care  policy.    Recent Labs   Lab Test 01/13/25  0936 09/16/24  0901   CHOL 214* 191   HDL 40 36*   *  154* 129*  130*   TRIG 128 131       Request from pharmacy:  Requested Prescriptions   Pending Prescriptions Disp Refills    ezetimibe (ZETIA) 10 MG tablet [Pharmacy Med Name: EZETIMIBE 10 MG TABLET] 90 tablet 3     Sig: TAKE 1 TABLET (10 MG) BY MOUTH DAILY.       Antihyperlipidemic agents Passed - 6/2/2025  3:57 PM        Passed - LDL on file in the past 12 months        Passed - Medication is active on med list and the sig matches. RN to manually verify dose and sig if red X/fail.     If the protocol passes (green check), you do not need to verify med dose and sig.    A prescription matches if they are the same clinical intention.    For Example: once daily and every morning are the same.    The protocol can not identify upper and lower case letters as matching and will fail.     For Example: Take 1 tablet (50 mg) by mouth daily     TAKE 1 TABLET (50 MG) BY MOUTH DAILY    For all fails (red x), verify dose and sig.    If the refill does match what is on file, the RN can still proceed to approve the refill request.       If they do not match, route to the appropriate provider.             Passed - Recent (12 month) or future (90 days) visit with authorizing provider's specialty (provided they have been seen in the past 15 months)     The patient must have completed an  in-person or virtual visit within the past 12 months or has a future visit scheduled within the next 90 days with the authorizing provider s specialty.  Urgent care and e-visits do not qualify as an office visit for this protocol.          Passed - Patient is age 18 years or older

## 2025-06-13 ENCOUNTER — RESULTS FOLLOW-UP (OUTPATIENT)
Dept: CARDIOLOGY | Facility: CLINIC | Age: 65
End: 2025-06-13

## 2025-06-13 PROCEDURE — 99000 SPECIMEN HANDLING OFFICE-LAB: CPT | Performed by: PATHOLOGY

## 2025-06-13 PROCEDURE — 83721 ASSAY OF BLOOD LIPOPROTEIN: CPT | Performed by: INTERNAL MEDICINE

## 2025-06-22 ENCOUNTER — HEALTH MAINTENANCE LETTER (OUTPATIENT)
Age: 65
End: 2025-06-22

## 2025-06-29 DIAGNOSIS — I10 ESSENTIAL HYPERTENSION: ICD-10-CM

## 2025-06-29 DIAGNOSIS — R55 SYNCOPE, UNSPECIFIED SYNCOPE TYPE: ICD-10-CM

## 2025-06-30 RX ORDER — AMLODIPINE BESYLATE 5 MG/1
5 TABLET ORAL DAILY
Qty: 30 TABLET | Refills: 0 | Status: SHIPPED | OUTPATIENT
Start: 2025-06-30

## 2025-06-30 NOTE — TELEPHONE ENCOUNTER
Last Written Prescription:  amLODIPine (NORVASC) 5 MG tablet 90 tablet 2 10/10/2024 -- No   Sig - Route: Take 1 tablet (5 mg) by mouth daily. - Oral     ----------------------  Last Visit Date:   10/14/2024  [facility]    Future Visit Date:    7/14/2025 12:30 PM (30 min)  [place]   Arrive by: 12:15 PM   RETURN CARDIOLOGY   Rfl Status: Closed   [facility] ([facility])   Jerry Smallwood MD     ----------------------      Refill decision:   [x] Medication refilled per  Medication Refill in Ambulatory Care  policy. - 30 days sent until next appointment 7/14/2025 -due for blood pressure     Last Comprehensive Metabolic Panel:  Lab Results   Component Value Date     06/13/2025    POTASSIUM 4.1 06/13/2025    CHLORIDE 103 06/13/2025    CO2 25 06/13/2025    ANIONGAP 10 06/13/2025    GLC 97 06/13/2025    BUN 23.4 (H) 06/13/2025    CR 1.15 06/13/2025    GFRESTIMATED 71 06/13/2025    PAMELA 9.4 06/13/2025     BP Readings from Last 3 Encounters:   06/13/24 132/74   06/26/23 (!) 150/80   07/28/22 (!) 162/80       Request from pharmacy:  Requested Prescriptions   Pending Prescriptions Disp Refills    amLODIPine (NORVASC) 5 MG tablet [Pharmacy Med Name: AMLODIPINE BESYLATE 5 MG TAB] 90 tablet 2     Sig: TAKE 1 TABLET BY MOUTH EVERY DAY       Calcium Channel Blockers Protocol  Failed - 6/30/2025 11:49 AM        Failed - Most recent blood pressure under 140/90 in past 12 months     BP Readings from Last 3 Encounters:   06/13/24 132/74   06/26/23 (!) 150/80   07/28/22 (!) 162/80       No data recorded            Passed - Medication is active on med list and the sig matches. RN to manually verify dose and sig if red X/fail.     If the protocol passes (green check), you do not need to verify med dose and sig.    A prescription matches if they are the same clinical intention.    For Example: once daily and every morning are the same.    The protocol can not identify upper and lower case letters as matching and will  fail.     For Example: Take 1 tablet (50 mg) by mouth daily     TAKE 1 TABLET (50 MG) BY MOUTH DAILY    For all fails (red x), verify dose and sig.    If the refill does match what is on file, the RN can still proceed to approve the refill request.       If they do not match, route to the appropriate provider.             Passed - Medication is indicated for associated diagnosis        Passed - GFR is on file in the past 12 months and most recent GFR is normal        Passed - Recent (12 month) or future (90 days) visit with authorizing provider's specialty (provided they have been seen in the past 15 months)     The patient must have completed an in-person or virtual visit within the past 12 months or has a future visit scheduled within the next 90 days with the authorizing provider s specialty.  Urgent care and e-visits do not qualify as an office visit for this protocol.          Passed - Patient is age 18 or older

## 2025-07-14 ENCOUNTER — OFFICE VISIT (OUTPATIENT)
Dept: CARDIOLOGY | Facility: CLINIC | Age: 65
End: 2025-07-14
Attending: INTERNAL MEDICINE
Payer: COMMERCIAL

## 2025-07-14 VITALS
HEART RATE: 78 BPM | WEIGHT: 212.9 LBS | DIASTOLIC BLOOD PRESSURE: 77 MMHG | OXYGEN SATURATION: 98 % | SYSTOLIC BLOOD PRESSURE: 150 MMHG | BODY MASS INDEX: 29.71 KG/M2

## 2025-07-14 DIAGNOSIS — I35.0 AORTIC VALVE STENOSIS, ETIOLOGY OF CARDIAC VALVE DISEASE UNSPECIFIED: ICD-10-CM

## 2025-07-14 DIAGNOSIS — E78.5 HYPERLIPIDEMIA LDL GOAL <100: ICD-10-CM

## 2025-07-14 PROCEDURE — 99214 OFFICE O/P EST MOD 30 MIN: CPT | Performed by: INTERNAL MEDICINE

## 2025-07-14 PROCEDURE — 99213 OFFICE O/P EST LOW 20 MIN: CPT | Performed by: INTERNAL MEDICINE

## 2025-07-14 PROCEDURE — 3077F SYST BP >= 140 MM HG: CPT | Performed by: INTERNAL MEDICINE

## 2025-07-14 PROCEDURE — 3078F DIAST BP <80 MM HG: CPT | Performed by: INTERNAL MEDICINE

## 2025-07-14 PROCEDURE — 1126F AMNT PAIN NOTED NONE PRSNT: CPT | Performed by: INTERNAL MEDICINE

## 2025-07-14 ASSESSMENT — PAIN SCALES - GENERAL: PAINLEVEL_OUTOF10: NO PAIN (0)

## 2025-07-14 NOTE — NURSING NOTE
Chief Complaint   Patient presents with    Follow Up     RETURN CARDIOLOGY - Follow up with Dr. Smallwood     Vitals were taken and medications reconciled.    Sarthak Hernandez, JYOTSNA  12:38 PM

## 2025-07-14 NOTE — LETTER
7/14/2025      RE: Yasir Hines  [address] [id number]       Dear Colleague,    Thank you for the opportunity to participate in the care of your patient, Yasir Hines, at the [facility] [place] [facility]. Please see a copy of my visit note below.    HPI: I had the privilege to evaluate and examine Mr Yasir Hines, who is a 64 yr male patient with a Hx of hypercholesterolemia, hypertension.     Patient denies chest pain, shortness of breath, palpitations and intermittent claudication.     A coronary CT CAC showed showed a CAC score of 319 placing patient in 83rd percentile for risk of developing CAD     His major complaints is now low back pain and paresthesias and pain in his legs.     His BP is well controlled.    PAST MEDICAL HISTORY:  Past Medical History:   Diagnosis Date     Hypertension        CURRENT MEDICATIONS:  Current Outpatient Medications   Medication Sig Dispense Refill     amLODIPine (NORVASC) 5 MG tablet Take 1 tablet (5 mg) by mouth daily. 30 tablet 0     evolocumab (REPATHA) 140 MG/ML prefilled autoinjector Inject 1 mL (140 mg) subcutaneously every 14 days. 6 mL 1     ezetimibe (ZETIA) 10 MG tablet Take 1 tablet (10 mg) by mouth daily. 90 tablet 2     flurbiprofen (ANSAID) 100 MG tablet Take 1 tablet (100 mg) by mouth 3 times daily as needed (moderate pain) 90 tablet 3     Glucosamine Sulfate 1000 MG TABS        losartan-hydrochlorothiazide (HYZAAR) 50-12.5 MG tablet TAKE 1 TABLET BY MOUTH EVERY DAY 90 tablet 0     omega 3 1000 MG CAPS        aspirin (ASPIRIN LOW DOSE) 81 MG EC tablet Take 1 tablet (81 mg) by mouth daily (Patient not taking: Reported on 7/14/2025) 90 tablet 2     cyclobenzaprine (FLEXERIL) 5 MG tablet Take 1-2 tablets (5-10 mg) by mouth 3 times daily as needed for muscle spasms (Patient not taking: Reported on 7/28/2022) 90 tablet 1     gabapentin (NEURONTIN) 300 MG capsule Take  1 capsule (300 mg) by mouth 3 times daily (Patient not taking: Reported on 6/13/2024) 270 capsule 6     meloxicam (MOBIC) 7.5 MG tablet Take 2 tablets (15 mg) by mouth daily (Patient not taking: Reported on 6/13/2024) 60 tablet 1     tiZANidine (ZANAFLEX) 2 MG tablet Take 1 tablet (2 mg) by mouth 3 times daily as needed for muscle spasms (Patient not taking: Reported on 1/27/2022) 30 tablet 0       PAST SURGICAL HISTORY:  Past Surgical History:   Procedure Laterality Date     INJECT EPIDURAL LUMBAR / SACRAL SINGLE N/A 11/23/2021    Procedure: Lumbar 4 5 Interlaminal Epidural Steroid Injection @;  Surgeon: Vaibhav Rucker MD;  Location: [facility] OR       ALLERGIES   No Known Allergies    FAMILY HISTORY:  No family history on file.    SOCIAL HISTORY:  Social History     Socioeconomic History     Marital status:      Spouse name: None     Number of children: None     Years of education: None     Highest education level: None   Tobacco Use     Smoking status: Never     Smokeless tobacco: Never   Substance and Sexual Activity     Alcohol use: Yes     Comment: occasionally     Drug use: Never     Social Drivers of Health     Financial Resource Strain: Low Risk  (5/12/2025)    [facility]    Financial Resource Strain      Difficulty of Paying Living Expenses: 3   Food Insecurity: No Food Insecurity (5/12/2025)    Received from [facility]    Food Insecurity      Do you worry your food will run out before you are able to buy more?: 1   Transportation Needs: No Transportation Needs (5/12/2025)    Received from [facility]    Transportation Needs      Does lack of transportation keep you from medical appointments?: 1      Does lack of transportation keep you from work, meetings or getting things that you need?: 1   Physical Activity: Sufficiently Active (10/18/2024)    Received from [facility]    Exercise  Vital Sign      Days of Exercise per Week: 6 days      Minutes of Exercise per Session: 120 min   Social Connections: Socially Integrated (5/12/2025)    Received from [facility]    Social Connections      Do you often feel lonely or isolated from those around you?: 0   Housing Stability: Low Risk  (5/12/2025)    Received from [facility]    Housing Stability      What is your housing situation today?: 1       ROS:   Constitutional: No fever, chills, or sweats. No weight gain/loss   ENT: No visual disturbance, ear ache, epistaxis, sore throat  Allergies/Immunologic: Negative.   Respiratory: No cough, hemoptysia  Cardiovascular: As per HPI  GI: No nausea, vomiting, hematemesis, melena, or hematochezia  : No urinary frequency, dysuria, or hematuria  Integument: Negative  Psychiatric: Negative  Neuro: Negative  Endocrinology: Negative   Musculoskeletal: Negative    EXAM:  BP (!) 150/77 (BP Location: Left arm, Patient Position: Chair, Cuff Size: Adult Regular)   Pulse 78   Wt 96.6 kg (212 lb 14.4 oz)   SpO2 98%   BMI 29.71 kg/m    In general, the patient is a pleasant male in no apparent distress.    HEENT: NC/AT.  PERRLA.  EOMI.  Sclerae white, not injected.  Nares clear.  Pharynx without erythema or exudate.  Dentition intact.    Neck: No adenopathy.  No thyromegaly. Carotids +4/4 bilaterally without bruits.  No jugular venous distension.   Heart: RRR. Normal S1, S2 splits physiologically. No murmur, rub, click, or gallop. The PMI is in the 5th ICS in the midclavicular line. There is no heave.    Lungs: CTA.  No ronchi, wheezes, rales.  No dullness to percussion.   Abdomen: Soft, nontender, nondistended. No organomegaly.  No bruits.   Extremities: No clubbing, cyanosis, or edema.  The pulses are +4/4 at the radial, brachial, femoral, popliteal, DP, and PT sites bilaterally.  No bruits are noted.  Neurologic: Alert and oriented to  person/place/time, normal speech, gait and affect  Skin: No petechiae, purpura or rash.    BP at the end iegho751/80 mmHg, lefy 125/74    Labs:  LIPID RESULTS:  Lab Results   Component Value Date    CHOL 194 06/13/2025    HDL 46 06/13/2025     (H) 06/13/2025     (H) 06/13/2025    TRIG 60 06/13/2025    NHDL 148 (H) 06/13/2025       LIVER ENZYME RESULTS:  Lab Results   Component Value Date    AST 32 06/13/2025    ALT 34 06/13/2025         BMP RESULTS:  Lab Results   Component Value Date     06/13/2025    POTASSIUM 4.1 06/13/2025    POTASSIUM 4.3 07/27/2022    CHLORIDE 103 06/13/2025    CHLORIDE 108 07/27/2022    CO2 25 06/13/2025    CO2 28 07/27/2022    ANIONGAP 10 06/13/2025    ANIONGAP <1 (L) 07/27/2022    GLC 97 06/13/2025    GLC 98 07/27/2022    BUN 23.4 (H) 06/13/2025    BUN 19 07/27/2022    CR 1.15 06/13/2025    GFRESTIMATED 71 06/13/2025    PAMELA 9.4 06/13/2025             Assessment and Plan:     I discussed the results with patient.  I discussed the importance of a heart healthy diet and lifestyle.  I discussed the following items:    Medication Changes:   - Continue medications as prescribed     Follow Up:   - Fasting labs in 3 months  - 1 year follow up with echocardiogram, ultrasound carotid bilateral and fasting labs prior     Jerry Lee MD, PhD  Professor of Medicine  Division of Cardiology          CC  Patient Care Team:  Hay Melendez as PCP - General (Internal Medicine)  Jerry Lee MD as MD (Cardiovascular Disease)  Jerry Lee MD as Assigned Heart and Vascular Provider  Giselle Blake, RN as Specialty Care Coordinator (Cardiology)  JERRY LEE      Please do not hesitate to contact me if you have any questions/concerns.     Sincerely,     Jerry Lee MD

## 2025-07-14 NOTE — PATIENT INSTRUCTIONS
July 14, 2025    Cardiology Provider You Saw During Your Visit: Dr. Smallwood      Medication Changes:   - Continue medications as prescribed    Follow Up:   - Fasting labs in 3 months  - 1 year follow up with echocardiogram, ultrasound carotid bilateral and fasting labs prior     Labs:     Component      Latest Ref Rng 6/13/2025  9:38 AM   Sodium      135 - 145 mmol/L 138    Potassium      3.4 - 5.3 mmol/L 4.1    Carbon Dioxide (CO2)      22 - 29 mmol/L 25    Anion Gap      7 - 15 mmol/L 10    Urea Nitrogen      8.0 - 23.0 mg/dL 23.4 (H)    Creatinine      0.67 - 1.17 mg/dL 1.15    GFR Estimate      >60 mL/min/1.73m2 71    Calcium      8.8 - 10.4 mg/dL 9.4    Chloride      98 - 107 mmol/L 103    Glucose      70 - 99 mg/dL 97    Alkaline Phosphatase      40 - 150 U/L 58    AST      0 - 45 U/L 32    ALT      0 - 70 U/L 34    Protein Total      6.4 - 8.3 g/dL 7.9    Albumin      3.5 - 5.2 g/dL 4.1    Bilirubin Total      <=1.2 mg/dL 0.5    Patient Fasting? Yes    Patient Fasting? Yes    Cholesterol      <200 mg/dL 194    Triglycerides      <150 mg/dL 60    HDL Cholesterol      >=40 mg/dL 46    LDL Cholesterol Calculated      <100 mg/dL 136 (H)    Non HDL Cholesterol      <130 mg/dL 148 (H)    LDL Cholesterol Direct      <100 mg/dL 140 (H)       Legend:  (H) High      Follow the American Heart Association Diet and Lifestyle Recommendations:  -Limit saturated fat, trans fat, sodium, red meat, sweets and sugar-sweetened beverages. If you choose to eat red meat, compare labels and select the leanest cuts available.  -Aim for at least 150 minutes of moderate physical activity or 75 minutes of vigorous physical activity - or an equal combination of both - each week.      To Reach Us:  -During business hours: [phone], press option # 1 to schedule an appointment or to leave a message for your care team.     -After hours, weekends or holidays: [phone], press option #4 and ask to speak to the on-call cardiologist. Inform  them you are a patient at the [facility].        **If you have a cardiac device, please make sure you schedule an in-person device check just prior to your cardiology provider appointments**        Giselle Blake RN  Cardiology Care Coordinator - General Cardiology  [facility]

## 2025-07-14 NOTE — PROGRESS NOTES
HPI: I had the privilege to evaluate and examine Mr Yasir Hines, who is a 64 yr male patient with a Hx of hypercholesterolemia, hypertension.     Patient denies chest pain, shortness of breath, palpitations and intermittent claudication.     A coronary CT CAC showed showed a CAC score of 319 placing patient in 83rd percentile for risk of developing CAD     His major complaints is now low back pain and paresthesias and pain in his legs.     His BP is well controlled.    PAST MEDICAL HISTORY:  Past Medical History:   Diagnosis Date    Hypertension        CURRENT MEDICATIONS:  Current Outpatient Medications   Medication Sig Dispense Refill    amLODIPine (NORVASC) 5 MG tablet Take 1 tablet (5 mg) by mouth daily. 30 tablet 0    evolocumab (REPATHA) 140 MG/ML prefilled autoinjector Inject 1 mL (140 mg) subcutaneously every 14 days. 6 mL 1    ezetimibe (ZETIA) 10 MG tablet Take 1 tablet (10 mg) by mouth daily. 90 tablet 2    flurbiprofen (ANSAID) 100 MG tablet Take 1 tablet (100 mg) by mouth 3 times daily as needed (moderate pain) 90 tablet 3    Glucosamine Sulfate 1000 MG TABS       losartan-hydrochlorothiazide (HYZAAR) 50-12.5 MG tablet TAKE 1 TABLET BY MOUTH EVERY DAY 90 tablet 0    omega 3 1000 MG CAPS       aspirin (ASPIRIN LOW DOSE) 81 MG EC tablet Take 1 tablet (81 mg) by mouth daily (Patient not taking: Reported on 7/14/2025) 90 tablet 2    cyclobenzaprine (FLEXERIL) 5 MG tablet Take 1-2 tablets (5-10 mg) by mouth 3 times daily as needed for muscle spasms (Patient not taking: Reported on 7/28/2022) 90 tablet 1    gabapentin (NEURONTIN) 300 MG capsule Take 1 capsule (300 mg) by mouth 3 times daily (Patient not taking: Reported on 6/13/2024) 270 capsule 6    meloxicam (MOBIC) 7.5 MG tablet Take 2 tablets (15 mg) by mouth daily (Patient not taking: Reported on 6/13/2024) 60 tablet 1    tiZANidine (ZANAFLEX) 2 MG tablet Take 1 tablet (2 mg) by mouth 3 times daily as needed for muscle spasms (Patient not taking:  Reported on 1/27/2022) 30 tablet 0       PAST SURGICAL HISTORY:  Past Surgical History:   Procedure Laterality Date    INJECT EPIDURAL LUMBAR / SACRAL SINGLE N/A 11/23/2021    Procedure: Lumbar 4 5 Interlaminal Epidural Steroid Injection @;  Surgeon: Vaibhav Rucker MD;  Location: [facility] OR       ALLERGIES   No Known Allergies    FAMILY HISTORY:  No family history on file.    SOCIAL HISTORY:  Social History     Socioeconomic History    Marital status:      Spouse name: None    Number of children: None    Years of education: None    Highest education level: None   Tobacco Use    Smoking status: Never    Smokeless tobacco: Never   Substance and Sexual Activity    Alcohol use: Yes     Comment: occasionally    Drug use: Never     Social Drivers of Health     Financial Resource Strain: Low Risk  (5/12/2025)    Received from [facility]    Financial Resource Strain     Difficulty of Paying Living Expenses: 3   Food Insecurity: No Food Insecurity (5/12/2025)    Received from [facility]    Food Insecurity     Do you worry your food will run out before you are able to buy more?: 1   Transportation Needs: No Transportation Needs (5/12/2025)    Received from [facility]    Transportation Needs     Does lack of transportation keep you from medical appointments?: 1     Does lack of transportation keep you from work, meetings or getting things that you need?: 1   Physical Activity: Sufficiently Active (10/18/2024)    Received from [facility]    Exercise Vital Sign     Days of Exercise per Week: 6 days     Minutes of Exercise per Session: 120 min   Social Connections: Socially Integrated (5/12/2025)    Received from [facility]    Social Connections     Do you often feel lonely or isolated from those around you?: 0   Housing Stability: Low Risk  (5/12/2025)    Received from [facility]    Housing Stability     What is your housing situation today?: 1       ROS:   Constitutional: No fever, chills, or sweats. No weight gain/loss   ENT: No visual disturbance, ear ache, epistaxis, sore throat  Allergies/Immunologic: Negative.   Respiratory: No cough, hemoptysia  Cardiovascular: As per HPI  GI: No nausea, vomiting, hematemesis, melena, or hematochezia  : No urinary frequency, dysuria, or hematuria  Integument: Negative  Psychiatric: Negative  Neuro: Negative  Endocrinology: Negative   Musculoskeletal: Negative    EXAM:  BP (!) 150/77 (BP Location: Left arm, Patient Position: Chair, Cuff Size: Adult Regular)   Pulse 78   Wt 96.6 kg (212 lb 14.4 oz)   SpO2 98%   BMI 29.71 kg/m    In general, the patient is a pleasant male in no apparent distress.    HEENT: NC/AT.  PERRLA.  EOMI.  Sclerae white, not injected.  Nares clear.  Pharynx without erythema or exudate.  Dentition intact.    Neck: No adenopathy.  No thyromegaly. Carotids +4/4 bilaterally without bruits.  No jugular venous distension.   Heart: RRR. Normal S1, S2 splits physiologically. No murmur, rub, click, or gallop. The PMI is in the 5th ICS in the midclavicular line. There is no heave.    Lungs: CTA.  No ronchi, wheezes, rales.  No dullness to percussion.   Abdomen: Soft, nontender, nondistended. No organomegaly.  No bruits.   Extremities: No clubbing, cyanosis, or edema.  The pulses are +4/4 at the radial, brachial, femoral, popliteal, DP, and PT sites bilaterally.  No bruits are noted.  Neurologic: Alert and oriented to person/place/time, normal speech, gait and affect  Skin: No petechiae, purpura or rash.    BP at the end cufkj551/80 mmHg, lefy 125/74    Labs:  LIPID RESULTS:  Lab Results   Component Value Date    CHOL 194 06/13/2025    HDL 46 06/13/2025     (H) 06/13/2025     (H) 06/13/2025    TRIG 60 06/13/2025    NHDL 148 (H) 06/13/2025       LIVER ENZYME RESULTS:  Lab Results   Component  Value Date    AST 32 06/13/2025    ALT 34 06/13/2025         BMP RESULTS:  Lab Results   Component Value Date     06/13/2025    POTASSIUM 4.1 06/13/2025    POTASSIUM 4.3 07/27/2022    CHLORIDE 103 06/13/2025    CHLORIDE 108 07/27/2022    CO2 25 06/13/2025    CO2 28 07/27/2022    ANIONGAP 10 06/13/2025    ANIONGAP <1 (L) 07/27/2022    GLC 97 06/13/2025    GLC 98 07/27/2022    BUN 23.4 (H) 06/13/2025    BUN 19 07/27/2022    CR 1.15 06/13/2025    GFRESTIMATED 71 06/13/2025    PAMELA 9.4 06/13/2025             Assessment and Plan:     I discussed the results with patient.  I discussed the importance of a heart healthy diet and lifestyle.  I discussed the following items:    Medication Changes:   - Continue medications as prescribed     Follow Up:   - Fasting labs in 3 months  - 1 year follow up with echocardiogram, ultrasound carotid bilateral and fasting labs prior     Jerry Lee MD, PhD  Professor of Medicine  Division of Cardiology          CC  Patient Care Team:  Hay Melendez as PCP - General (Internal Medicine)  Jerry Lee MD as MD (Cardiovascular Disease)  Jerry Lee MD as Assigned Heart and Vascular Provider  Giselle Blake, RN as Specialty Care Coordinator (Cardiology)  JERRY LEE

## 2025-08-18 DIAGNOSIS — Z78.9 STATIN INTOLERANCE: ICD-10-CM

## 2025-08-18 DIAGNOSIS — E78.5 HLD (HYPERLIPIDEMIA): ICD-10-CM

## 2025-08-20 ENCOUNTER — MYC REFILL (OUTPATIENT)
Dept: CARDIOLOGY | Facility: CLINIC | Age: 65
End: 2025-08-20
Payer: COMMERCIAL

## 2025-08-20 DIAGNOSIS — E78.5 HLD (HYPERLIPIDEMIA): ICD-10-CM

## 2025-08-20 DIAGNOSIS — Z78.9 STATIN INTOLERANCE: ICD-10-CM

## 2025-08-20 RX ORDER — EVOLOCUMAB 140 MG/ML
140 INJECTION, SOLUTION SUBCUTANEOUS
Qty: 6 ML | Refills: 3 | OUTPATIENT
Start: 2025-08-20

## (undated) DEVICE — GLOVE PROTEXIS POWDER FREE SMT 8.0  2D72PT80X

## (undated) DEVICE — NDL EPIDURAL TUOHY 20GA 3.5" 405028

## (undated) DEVICE — PREP CHLORAPREP W/ORANGE TINT 10.5ML 260715

## (undated) DEVICE — SYR 10ML PERFIX LL 332152

## (undated) DEVICE — TRAY PAIN INJECTION 97A 640